# Patient Record
Sex: FEMALE | Race: WHITE | Employment: OTHER | URBAN - METROPOLITAN AREA
[De-identification: names, ages, dates, MRNs, and addresses within clinical notes are randomized per-mention and may not be internally consistent; named-entity substitution may affect disease eponyms.]

---

## 2017-10-04 PROBLEM — E55.9 VITAMIN D DEFICIENCY: Status: ACTIVE | Noted: 2017-10-04

## 2017-10-04 PROBLEM — E53.8 B12 DEFICIENCY: Status: ACTIVE | Noted: 2017-10-04

## 2017-10-04 PROBLEM — M21.612 BUNION, LEFT FOOT: Status: ACTIVE | Noted: 2017-10-04

## 2017-10-04 PROCEDURE — 82607 VITAMIN B-12: CPT | Performed by: INTERNAL MEDICINE

## 2018-05-02 PROCEDURE — 82570 ASSAY OF URINE CREATININE: CPT | Performed by: INTERNAL MEDICINE

## 2018-05-02 PROCEDURE — 82043 UR ALBUMIN QUANTITATIVE: CPT | Performed by: INTERNAL MEDICINE

## 2019-01-01 ENCOUNTER — TELEPHONE (OUTPATIENT)
Dept: HEMATOLOGY/ONCOLOGY | Facility: HOSPITAL | Age: 69
End: 2019-01-01

## 2019-01-01 ENCOUNTER — HOSPITAL ENCOUNTER (OUTPATIENT)
Dept: MAMMOGRAPHY | Facility: HOSPITAL | Age: 69
Discharge: HOME | End: 2019-01-01
Attending: INTERNAL MEDICINE
Payer: MEDICARE

## 2019-01-01 ENCOUNTER — TELEPHONE (OUTPATIENT)
Dept: CT IMAGING | Facility: HOSPITAL | Age: 69
End: 2019-01-01

## 2019-01-01 ENCOUNTER — HOSPITAL ENCOUNTER (OUTPATIENT)
Dept: MAMMOGRAPHY | Facility: HOSPITAL | Age: 69
Discharge: HOME | End: 2019-01-01
Attending: SURGERY
Payer: MEDICARE

## 2019-01-01 ENCOUNTER — HOSPITAL ENCOUNTER (OUTPATIENT)
Dept: MAMMOGRAPHY | Age: 69
Discharge: HOME | End: 2019-01-01
Attending: INTERNAL MEDICINE
Payer: MEDICARE

## 2019-01-01 ENCOUNTER — OFFICE VISIT (OUTPATIENT)
Dept: HEMATOLOGY/ONCOLOGY | Facility: HOSPITAL | Age: 69
End: 2019-01-01
Attending: THORACIC SURGERY (CARDIOTHORACIC VASCULAR SURGERY)
Payer: MEDICARE

## 2019-01-01 ENCOUNTER — HOSPITAL ENCOUNTER (OUTPATIENT)
Age: 69
Discharge: HOME | End: 2019-01-01
Attending: FAMILY MEDICINE
Payer: MEDICARE

## 2019-01-01 ENCOUNTER — HOSPITAL ENCOUNTER (OUTPATIENT)
Age: 69
Discharge: HOME | End: 2019-01-01
Payer: MEDICARE

## 2019-01-01 ENCOUNTER — HOSPITAL ENCOUNTER (OUTPATIENT)
Dept: MRI IMAGING | Facility: HOSPITAL | Age: 69
Discharge: HOME | End: 2019-01-01
Attending: SURGERY
Payer: MEDICARE

## 2019-01-01 ENCOUNTER — APPOINTMENT (OUTPATIENT)
Dept: GENERAL RADIOLOGY | Age: 69
End: 2019-01-01
Attending: FAMILY MEDICINE
Payer: MEDICARE

## 2019-01-01 ENCOUNTER — HOSPITAL ENCOUNTER (INPATIENT)
Facility: HOSPITAL | Age: 69
LOS: 1 days | Discharge: HOME | DRG: 803 | End: 2019-01-01
Attending: THORACIC SURGERY (CARDIOTHORACIC VASCULAR SURGERY) | Admitting: THORACIC SURGERY (CARDIOTHORACIC VASCULAR SURGERY)
Payer: MEDICARE

## 2019-01-01 ENCOUNTER — APPOINTMENT (OUTPATIENT)
Dept: LAB | Age: 69
End: 2019-01-01
Attending: RADIOLOGY
Payer: MEDICARE

## 2019-01-01 ENCOUNTER — TELEPHONE (OUTPATIENT)
Dept: MAMMOGRAPHY | Facility: HOSPITAL | Age: 69
End: 2019-01-01

## 2019-01-01 ENCOUNTER — HOSPITAL ENCOUNTER (OUTPATIENT)
Dept: MRI IMAGING | Facility: HOSPITAL | Age: 69
Discharge: HOME | End: 2019-01-01
Attending: PHYSICIAN ASSISTANT
Payer: MEDICARE

## 2019-01-01 VITALS
DIASTOLIC BLOOD PRESSURE: 63 MMHG | WEIGHT: 132.69 LBS | SYSTOLIC BLOOD PRESSURE: 121 MMHG | HEIGHT: 62 IN | OXYGEN SATURATION: 99 % | HEART RATE: 68 BPM | RESPIRATION RATE: 18 BRPM | BODY MASS INDEX: 24.42 KG/M2 | TEMPERATURE: 98 F

## 2019-01-01 VITALS
OXYGEN SATURATION: 96 % | HEIGHT: 62.01 IN | BODY MASS INDEX: 23.69 KG/M2 | HEART RATE: 81 BPM | WEIGHT: 130.38 LBS | DIASTOLIC BLOOD PRESSURE: 74 MMHG | TEMPERATURE: 97 F | SYSTOLIC BLOOD PRESSURE: 152 MMHG | RESPIRATION RATE: 20 BRPM

## 2019-01-01 VITALS
RESPIRATION RATE: 16 BRPM | TEMPERATURE: 98 F | SYSTOLIC BLOOD PRESSURE: 179 MMHG | HEART RATE: 85 BPM | DIASTOLIC BLOOD PRESSURE: 83 MMHG | OXYGEN SATURATION: 100 %

## 2019-01-01 VITALS
BODY MASS INDEX: 22.71 KG/M2 | DIASTOLIC BLOOD PRESSURE: 71 MMHG | SYSTOLIC BLOOD PRESSURE: 147 MMHG | TEMPERATURE: 98 F | OXYGEN SATURATION: 98 % | HEART RATE: 87 BPM | HEIGHT: 62.01 IN | WEIGHT: 125 LBS | RESPIRATION RATE: 12 BRPM

## 2019-01-01 VITALS
TEMPERATURE: 98 F | WEIGHT: 125 LBS | BODY MASS INDEX: 23 KG/M2 | SYSTOLIC BLOOD PRESSURE: 141 MMHG | HEART RATE: 86 BPM | HEIGHT: 62 IN | OXYGEN SATURATION: 99 % | DIASTOLIC BLOOD PRESSURE: 59 MMHG | RESPIRATION RATE: 16 BRPM

## 2019-01-01 DIAGNOSIS — R92.8 ABNORMAL FINDING ON BREAST IMAGING: ICD-10-CM

## 2019-01-01 DIAGNOSIS — R92.2 INCONCLUSIVE MAMMOGRAM: ICD-10-CM

## 2019-01-01 DIAGNOSIS — R92.8 ABNORMAL MAMMOGRAM: ICD-10-CM

## 2019-01-01 DIAGNOSIS — C50.812 MALIGNANT NEOPLASM OF OVERLAPPING SITES OF LEFT BREAST IN FEMALE, ESTROGEN RECEPTOR NEGATIVE (HCC): ICD-10-CM

## 2019-01-01 DIAGNOSIS — R92.8 ABNORMAL MAMMOGRAM OF BOTH BREASTS: ICD-10-CM

## 2019-01-01 DIAGNOSIS — R59.9 ENLARGED LYMPH NODES: ICD-10-CM
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
## 2019-01-01 DIAGNOSIS — R92.1 CALCIFICATION OF RIGHT BREAST ON MAMMOGRAPHY: ICD-10-CM

## 2019-01-01 DIAGNOSIS — H61.21 IMPACTED CERUMEN OF RIGHT EAR: Primary | ICD-10-CM

## 2019-01-01 DIAGNOSIS — C50.912 MALIGNANT NEOPLASM OF LEFT FEMALE BREAST, UNSPECIFIED ESTROGEN RECEPTOR STATUS, UNSPECIFIED SITE OF BREAST (HCC): ICD-10-CM

## 2019-01-01 DIAGNOSIS — M25.469 SUPRAPATELLAR EFFUSION OF KNEE: Primary | ICD-10-CM

## 2019-01-01 DIAGNOSIS — R92.8 ABNORMAL FINDING ON BREAST IMAGING: Primary | ICD-10-CM

## 2019-01-01 DIAGNOSIS — Z12.39 ENCOUNTER FOR SPECIAL SCREENING EXAMINATION FOR NEOPLASM OF BREAST: ICD-10-CM

## 2019-01-01 DIAGNOSIS — M17.12 PRIMARY OSTEOARTHRITIS OF LEFT KNEE: ICD-10-CM

## 2019-01-01 DIAGNOSIS — Z17.1 MALIGNANT NEOPLASM OF OVERLAPPING SITES OF LEFT BREAST IN FEMALE, ESTROGEN RECEPTOR NEGATIVE (HCC): ICD-10-CM
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
## 2019-01-01 LAB
ANTIBODY SCREEN: NEGATIVE
BLOOD TYPE BARCODE: 9500
DEPRECATED RDW RBC AUTO: 43.8 FL (ref 35.1–46.3)
ERYTHROCYTE [DISTWIDTH] IN BLOOD BY AUTOMATED COUNT: 13.3 % (ref 11–15)
GLUCOSE BLD-MCNC: 103 MG/DL (ref 70–99)
GLUCOSE BLD-MCNC: 105 MG/DL (ref 70–99)
GLUCOSE BLD-MCNC: 108 MG/DL (ref 70–99)
GLUCOSE BLD-MCNC: 149 MG/DL (ref 70–99)
GLUCOSE BLD-MCNC: 269 MG/DL (ref 70–99)
GLUCOSE BLD-MCNC: 72 MG/DL (ref 70–99)
GLUCOSE BLD-MCNC: 74 MG/DL (ref 70–99)
HCT VFR BLD AUTO: 33.9 % (ref 35–48)
HGB BLD-MCNC: 10.6 G/DL (ref 12–16)
MCH RBC QN AUTO: 27.9 PG (ref 26–34)
MCHC RBC AUTO-ENTMCNC: 31.3 G/DL (ref 31–37)
MCV RBC AUTO: 89.2 FL (ref 80–100)
PLATELET # BLD AUTO: 284 10(3)UL (ref 150–450)
RBC # BLD AUTO: 3.8 X10(6)UL (ref 3.8–5.3)
RH BLOOD TYPE: NEGATIVE
WBC # BLD AUTO: 7.4 X10(3) UL (ref 4–11)

## 2019-01-01 PROCEDURE — 86850 RBC ANTIBODY SCREEN: CPT | Performed by: THORACIC SURGERY (CARDIOTHORACIC VASCULAR SURGERY)

## 2019-01-01 PROCEDURE — 86901 BLOOD TYPING SEROLOGIC RH(D): CPT | Performed by: THORACIC SURGERY (CARDIOTHORACIC VASCULAR SURGERY)

## 2019-01-01 PROCEDURE — 77062 BREAST TOMOSYNTHESIS BI: CPT | Performed by: INTERNAL MEDICINE

## 2019-01-01 PROCEDURE — S0077 INJECTION, CLINDAMYCIN PHOSP: HCPCS | Performed by: PHYSICIAN ASSISTANT

## 2019-01-01 PROCEDURE — 99211 OFF/OP EST MAY X REQ PHY/QHP: CPT

## 2019-01-01 PROCEDURE — 76942 ECHO GUIDE FOR BIOPSY: CPT | Performed by: INTERNAL MEDICINE

## 2019-01-01 PROCEDURE — 69210 REMOVE IMPACTED EAR WAX UNI: CPT

## 2019-01-01 PROCEDURE — 99202 OFFICE O/P NEW SF 15 MIN: CPT

## 2019-01-01 PROCEDURE — 0BJ08ZZ INSPECTION OF TRACHEOBRONCHIAL TREE, VIA NATURAL OR ARTIFICIAL OPENING ENDOSCOPIC: ICD-10-PCS | Performed by: THORACIC SURGERY (CARDIOTHORACIC VASCULAR SURGERY)

## 2019-01-01 PROCEDURE — 99213 OFFICE O/P EST LOW 20 MIN: CPT

## 2019-01-01 PROCEDURE — A9575 INJ GADOTERATE MEGLUMI 0.1ML: HCPCS

## 2019-01-01 PROCEDURE — 82962 GLUCOSE BLOOD TEST: CPT

## 2019-01-01 PROCEDURE — 19081 BX BREAST 1ST LESION STRTCTC: CPT | Performed by: INTERNAL MEDICINE

## 2019-01-01 PROCEDURE — 73562 X-RAY EXAM OF KNEE 3: CPT | Performed by: FAMILY MEDICINE

## 2019-01-01 PROCEDURE — 87086 URINE CULTURE/COLONY COUNT: CPT | Performed by: PHYSICIAN ASSISTANT

## 2019-01-01 PROCEDURE — 77065 DX MAMMO INCL CAD UNI: CPT | Performed by: INTERNAL MEDICINE

## 2019-01-01 PROCEDURE — 38505 NEEDLE BIOPSY LYMPH NODES: CPT | Performed by: INTERNAL MEDICINE

## 2019-01-01 PROCEDURE — 3E0T3BZ INTRODUCTION OF ANESTHETIC AGENT INTO PERIPHERAL NERVES AND PLEXI, PERCUTANEOUS APPROACH: ICD-10-PCS | Performed by: THORACIC SURGERY (CARDIOTHORACIC VASCULAR SURGERY)

## 2019-01-01 PROCEDURE — A9575 INJ GADOTERATE MEGLUMI 0.1ML: HCPCS | Performed by: SURGERY

## 2019-01-01 PROCEDURE — 99204 OFFICE O/P NEW MOD 45 MIN: CPT

## 2019-01-01 PROCEDURE — 77067 SCR MAMMO BI INCL CAD: CPT | Performed by: INTERNAL MEDICINE

## 2019-01-01 PROCEDURE — 07B74ZX EXCISION OF THORAX LYMPHATIC, PERCUTANEOUS ENDOSCOPIC APPROACH, DIAGNOSTIC: ICD-10-PCS | Performed by: THORACIC SURGERY (CARDIOTHORACIC VASCULAR SURGERY)

## 2019-01-01 PROCEDURE — 88344 IMHCHEM/IMCYTCHM EA MLT ANTB: CPT | Performed by: INTERNAL MEDICINE

## 2019-01-01 PROCEDURE — 77049 MRI BREAST C-+ W/CAD BI: CPT | Performed by: PHYSICIAN ASSISTANT

## 2019-01-01 PROCEDURE — 19085 BX BREAST 1ST LESION MR IMAG: CPT | Performed by: SURGERY

## 2019-01-01 PROCEDURE — 85027 COMPLETE CBC AUTOMATED: CPT | Performed by: INTERNAL MEDICINE

## 2019-01-01 PROCEDURE — 85049 AUTOMATED PLATELET COUNT: CPT

## 2019-01-01 PROCEDURE — 77066 DX MAMMO INCL CAD BI: CPT | Performed by: INTERNAL MEDICINE

## 2019-01-01 PROCEDURE — 76641 ULTRASOUND BREAST COMPLETE: CPT | Performed by: INTERNAL MEDICINE

## 2019-01-01 PROCEDURE — 19086 BX BREAST ADD LESION MR IMAG: CPT | Performed by: SURGERY

## 2019-01-01 PROCEDURE — 77066 DX MAMMO INCL CAD BI: CPT | Performed by: SURGERY

## 2019-01-01 PROCEDURE — 36415 COLL VENOUS BLD VENIPUNCTURE: CPT

## 2019-01-01 PROCEDURE — 88305 TISSUE EXAM BY PATHOLOGIST: CPT | Performed by: SURGERY

## 2019-01-01 PROCEDURE — 88305 TISSUE EXAM BY PATHOLOGIST: CPT | Performed by: INTERNAL MEDICINE

## 2019-01-01 PROCEDURE — 19083 BX BREAST 1ST LESION US IMAG: CPT | Performed by: INTERNAL MEDICINE

## 2019-01-01 PROCEDURE — 88360 TUMOR IMMUNOHISTOCHEM/MANUAL: CPT | Performed by: INTERNAL MEDICINE

## 2019-01-01 PROCEDURE — 86920 COMPATIBILITY TEST SPIN: CPT

## 2019-01-01 PROCEDURE — 88305 TISSUE EXAM BY PATHOLOGIST: CPT | Performed by: THORACIC SURGERY (CARDIOTHORACIC VASCULAR SURGERY)

## 2019-01-01 PROCEDURE — 77063 BREAST TOMOSYNTHESIS BI: CPT | Performed by: INTERNAL MEDICINE

## 2019-01-01 PROCEDURE — 86900 BLOOD TYPING SEROLOGIC ABO: CPT | Performed by: THORACIC SURGERY (CARDIOTHORACIC VASCULAR SURGERY)

## 2019-01-01 PROCEDURE — 77049 MRI BREAST C-+ W/CAD BI: CPT | Performed by: SURGERY

## 2019-01-01 RX ORDER — DOCUSATE SODIUM 100 MG/1
100 CAPSULE, LIQUID FILLED ORAL 2 TIMES DAILY
Status: DISCONTINUED | OUTPATIENT
Start: 2019-01-01 | End: 2019-01-01

## 2019-01-01 RX ORDER — MORPHINE SULFATE 4 MG/ML
6 INJECTION, SOLUTION INTRAMUSCULAR; INTRAVENOUS EVERY 4 HOURS PRN
Status: DISCONTINUED | OUTPATIENT
Start: 2019-01-01 | End: 2019-01-01

## 2019-01-01 RX ORDER — SODIUM CHLORIDE 0.9 % (FLUSH) 0.9 %
10 SYRINGE (ML) INJECTION AS NEEDED
Status: DISCONTINUED | OUTPATIENT
Start: 2019-01-01 | End: 2019-01-01

## 2019-01-01 RX ORDER — SODIUM CHLORIDE 9 MG/ML
INJECTION, SOLUTION INTRAVENOUS CONTINUOUS
Status: DISCONTINUED | OUTPATIENT
Start: 2019-01-01 | End: 2019-01-01

## 2019-01-01 RX ORDER — ACETAMINOPHEN 10 MG/ML
1000 INJECTION, SOLUTION INTRAVENOUS EVERY 6 HOURS
Status: DISCONTINUED | OUTPATIENT
Start: 2019-01-01 | End: 2019-01-01

## 2019-01-01 RX ORDER — METOCLOPRAMIDE HYDROCHLORIDE 5 MG/ML
10 INJECTION INTRAMUSCULAR; INTRAVENOUS AS NEEDED
Status: DISCONTINUED | OUTPATIENT
Start: 2019-01-01 | End: 2019-01-01 | Stop reason: HOSPADM

## 2019-01-01 RX ORDER — HYDRALAZINE HYDROCHLORIDE 20 MG/ML
10 INJECTION INTRAMUSCULAR; INTRAVENOUS EVERY 4 HOURS PRN
Status: DISCONTINUED | OUTPATIENT
Start: 2019-01-01 | End: 2019-01-01

## 2019-01-01 RX ORDER — NALOXONE HYDROCHLORIDE 0.4 MG/ML
80 INJECTION, SOLUTION INTRAMUSCULAR; INTRAVENOUS; SUBCUTANEOUS AS NEEDED
Status: DISCONTINUED | OUTPATIENT
Start: 2019-01-01 | End: 2019-01-01 | Stop reason: HOSPADM

## 2019-01-01 RX ORDER — HYDROCODONE BITARTRATE AND ACETAMINOPHEN 5; 325 MG/1; MG/1
1 TABLET ORAL EVERY 6 HOURS PRN
Qty: 30 TABLET | Refills: 0 | Status: SHIPPED | OUTPATIENT
Start: 2019-01-01 | End: 2020-01-01 | Stop reason: ALTCHOICE

## 2019-01-01 RX ORDER — MORPHINE SULFATE 4 MG/ML
4 INJECTION, SOLUTION INTRAMUSCULAR; INTRAVENOUS EVERY 4 HOURS PRN
Status: DISCONTINUED | OUTPATIENT
Start: 2019-01-01 | End: 2019-01-01

## 2019-01-01 RX ORDER — CLINDAMYCIN PHOSPHATE 600 MG/50ML
600 INJECTION INTRAVENOUS EVERY 8 HOURS
Status: COMPLETED | OUTPATIENT
Start: 2019-01-01 | End: 2019-01-01

## 2019-01-01 RX ORDER — SODIUM PHOSPHATE, DIBASIC AND SODIUM PHOSPHATE, MONOBASIC 7; 19 G/133ML; G/133ML
1 ENEMA RECTAL ONCE AS NEEDED
Status: DISCONTINUED | OUTPATIENT
Start: 2019-01-01 | End: 2019-01-01

## 2019-01-01 RX ORDER — DEXTROSE MONOHYDRATE 25 G/50ML
50 INJECTION, SOLUTION INTRAVENOUS
Status: DISCONTINUED | OUTPATIENT
Start: 2019-01-01 | End: 2019-01-01 | Stop reason: HOSPADM

## 2019-01-01 RX ORDER — HYDROCODONE BITARTRATE AND ACETAMINOPHEN 5; 325 MG/1; MG/1
1 TABLET ORAL EVERY 6 HOURS PRN
Qty: 30 TABLET | Refills: 0 | Status: SHIPPED | OUTPATIENT
Start: 2019-01-01

## 2019-01-01 RX ORDER — OXYCODONE HYDROCHLORIDE 5 MG/1
5 TABLET ORAL EVERY 4 HOURS PRN
Status: DISCONTINUED | OUTPATIENT
Start: 2019-01-01 | End: 2019-01-01

## 2019-01-01 RX ORDER — ONDANSETRON 2 MG/ML
4 INJECTION INTRAMUSCULAR; INTRAVENOUS AS NEEDED
Status: DISCONTINUED | OUTPATIENT
Start: 2019-01-01 | End: 2019-01-01 | Stop reason: HOSPADM

## 2019-01-01 RX ORDER — HYDROMORPHONE HYDROCHLORIDE 1 MG/ML
0.4 INJECTION, SOLUTION INTRAMUSCULAR; INTRAVENOUS; SUBCUTANEOUS EVERY 5 MIN PRN
Status: DISCONTINUED | OUTPATIENT
Start: 2019-01-01 | End: 2019-01-01 | Stop reason: HOSPADM

## 2019-01-01 RX ORDER — ACETAMINOPHEN 500 MG
1000 TABLET ORAL ONCE AS NEEDED
Status: DISCONTINUED | OUTPATIENT
Start: 2019-01-01 | End: 2019-01-01 | Stop reason: HOSPADM

## 2019-01-01 RX ORDER — POLYETHYLENE GLYCOL 3350 17 G/17G
17 POWDER, FOR SOLUTION ORAL DAILY PRN
Status: DISCONTINUED | OUTPATIENT
Start: 2019-01-01 | End: 2019-01-01

## 2019-01-01 RX ORDER — CLINDAMYCIN PHOSPHATE 900 MG/50ML
INJECTION INTRAVENOUS
Status: COMPLETED | OUTPATIENT
Start: 2019-01-01 | End: 2019-01-01

## 2019-01-01 RX ORDER — SODIUM CHLORIDE, SODIUM LACTATE, POTASSIUM CHLORIDE, CALCIUM CHLORIDE 600; 310; 30; 20 MG/100ML; MG/100ML; MG/100ML; MG/100ML
INJECTION, SOLUTION INTRAVENOUS CONTINUOUS
Status: DISCONTINUED | OUTPATIENT
Start: 2019-01-01 | End: 2019-01-01 | Stop reason: HOSPADM

## 2019-01-01 RX ORDER — OXYCODONE HYDROCHLORIDE 10 MG/1
10 TABLET ORAL EVERY 4 HOURS PRN
Status: DISCONTINUED | OUTPATIENT
Start: 2019-01-01 | End: 2019-01-01

## 2019-01-01 RX ORDER — LABETALOL HYDROCHLORIDE 5 MG/ML
5 INJECTION, SOLUTION INTRAVENOUS EVERY 5 MIN PRN
Status: DISCONTINUED | OUTPATIENT
Start: 2019-01-01 | End: 2019-01-01 | Stop reason: HOSPADM

## 2019-01-01 RX ORDER — CALCIUM CARBONATE 200(500)MG
1000 TABLET,CHEWABLE ORAL 3 TIMES DAILY PRN
Status: DISCONTINUED | OUTPATIENT
Start: 2019-01-01 | End: 2019-01-01

## 2019-01-01 RX ORDER — MORPHINE SULFATE 4 MG/ML
2 INJECTION, SOLUTION INTRAMUSCULAR; INTRAVENOUS EVERY 4 HOURS PRN
Status: DISCONTINUED | OUTPATIENT
Start: 2019-01-01 | End: 2019-01-01

## 2019-01-01 RX ORDER — BISACODYL 10 MG
10 SUPPOSITORY, RECTAL RECTAL
Status: DISCONTINUED | OUTPATIENT
Start: 2019-01-01 | End: 2019-01-01

## 2019-01-01 RX ORDER — LEVOTHYROXINE SODIUM 112 UG/1
112 TABLET ORAL
Status: DISCONTINUED | OUTPATIENT
Start: 2019-01-01 | End: 2019-01-01

## 2019-01-01 RX ORDER — ONDANSETRON 2 MG/ML
4 INJECTION INTRAMUSCULAR; INTRAVENOUS EVERY 6 HOURS PRN
Status: DISCONTINUED | OUTPATIENT
Start: 2019-01-01 | End: 2019-01-01

## 2019-01-01 RX ORDER — BUPIVACAINE HYDROCHLORIDE 2.5 MG/ML
INJECTION, SOLUTION INFILTRATION; PERINEURAL AS NEEDED
Status: DISCONTINUED | OUTPATIENT
Start: 2019-01-01 | End: 2019-01-01

## 2019-01-01 RX ORDER — DULOXETIN HYDROCHLORIDE 30 MG/1
30 CAPSULE, DELAYED RELEASE ORAL 3 TIMES DAILY
Status: DISCONTINUED | OUTPATIENT
Start: 2019-01-01 | End: 2019-01-01

## 2019-01-01 RX ORDER — HEPARIN SODIUM 5000 [USP'U]/ML
5000 INJECTION, SOLUTION INTRAVENOUS; SUBCUTANEOUS EVERY 8 HOURS SCHEDULED
Status: DISCONTINUED | OUTPATIENT
Start: 2019-01-01 | End: 2019-01-01

## 2019-05-21 NOTE — IMAGING NOTE
Spoke w/pt re: left axillary lymphnode U/S guided bx and bilateral stereotactic breast bx at the recommendation of Dr Terry Real. The procedures were explained and written instructions given as well.  Pt denies blood thinners/bleeding issues, PLT count ordere

## 2019-06-10 NOTE — TELEPHONE ENCOUNTER
Spoke with someone from Dr. Sutton Box office. Pt has new diagnosis of Breast Cancer. Pt is scheduled to come in tomorrow and get results at our [facility]. She will speak with our Radiologist and Breast Care Coordinator Nurse.  PCP would like her

## 2019-06-11 NOTE — IMAGING NOTE
Verified pt's name and . Assisted Dr. Tracee Gee with [facility]. Spoke with patient regarding New breast cancer pathology. Multiple family members present for support. Breast Cancer Book reviewed and given to them.  Surgical consult is

## 2019-06-13 PROBLEM — Z17.1 MALIGNANT NEOPLASM OF OVERLAPPING SITES OF RIGHT BREAST IN FEMALE, ESTROGEN RECEPTOR NEGATIVE (HCC): Status: ACTIVE | Noted: 2019-01-01

## 2019-06-13 PROBLEM — C50.811 MALIGNANT NEOPLASM OF OVERLAPPING SITES OF RIGHT BREAST IN FEMALE, ESTROGEN RECEPTOR NEGATIVE (HCC): Status: ACTIVE | Noted: 2019-01-01

## 2019-06-13 PROBLEM — Z17.1 MALIGNANT NEOPLASM OF OVERLAPPING SITES OF LEFT BREAST IN FEMALE, ESTROGEN RECEPTOR NEGATIVE (HCC): Status: ACTIVE | Noted: 2019-01-01

## 2019-06-13 PROBLEM — C50.812 MALIGNANT NEOPLASM OF OVERLAPPING SITES OF LEFT BREAST IN FEMALE, ESTROGEN RECEPTOR NEGATIVE (HCC): Status: ACTIVE | Noted: 2019-01-01

## 2019-06-21 NOTE — TELEPHONE ENCOUNTER
Received a voicemail from patient regarding use of Claritin and thyroid medications. Explained to patient that I do not work with the [address] [place] [address], so I cannot offer her advice at this time re: medications.  Encouraged her to discuss with Dr. Abbie Kehr and

## 2019-06-26 NOTE — TELEPHONE ENCOUNTER
This Breast Care RN phoned pt re MRI biopsy recommendation by Dr. Miguel Greer for R breast.  Procedure reviewed and all questions answered. Emotional support given. Confirmed pt did receive educational handouts.   On the day of the biopsy, pt instructed to take

## 2019-06-26 NOTE — TELEPHONE ENCOUNTER
Pt phoned navigator with questions regarding her MRI guided biopsy ordered by Dr. Chikis Mohan. Pt states that she didn't know she needed an MRI guided biopsy.  I encouraged her to reach out to Dr. Danilo Chaves office to discuss this as I do not have the results of

## 2019-07-03 NOTE — IMAGING NOTE
Pt arrived with friend. Bilateral MRI guided procedure explained throughout and all questions answered. Consent and discharge paperwork signed. PIV placed in Left AC without difficulty. Emotional support provided throughout.   Pt placed prone in a comfort

## 2019-07-11 NOTE — H&P (VIEW-ONLY)
Thoracic Surgery Consult    Reason for Consultation: breast cancer, enlarged mediastinal node    Consulting Physician: Kari Hernandez    Chief Complaint: \"lymphnode\"    History of Present Illness: Patient is a 71year old, female with Hx newly dx breast cancer T3 mg total) by mouth every 8 (eight) hours as needed for Nausea., Disp: 20 tablet, Rfl: 3  •  LORazepam 0.5 MG Oral Tab, Take 1 tab every 6 hrs if needed for anxiety/difficulty sleeping on the days you take dexamethasone., Disp: 10 tablet, Rfl: 0  •  Prochlo over body  Adderall, [Amphetam*    HIVES  Advil [Ibuprofen]           Comment:Bowel obstruction  Alc-Benzyl Alc-Sulf*    RASH  Flexeril [Cyclobenz*        Comment:Bowel obstruction  Guaifenesin             SWELLING  Influenza Vaccines      ANAPHYLAXIS  Lac Smoking status: Never Smoker      Smokeless tobacco: Never Used    Alcohol use: No      Family History:    Family History   Problem Relation Age of Onset   • Cancer Father         renal cell arcinoma   • Cancer Brother         melanoma, esophageal of a hepatic hemangioma. 2.  Small left ovarian cyst. In light of age, continued imaging surveillance with pelvic ultrasound is recommended.     Assessment:    Patient is a 71year old, female Hx newly dx breast cancer T3N1 who presents today for evaluatio

## 2019-07-11 NOTE — CONSULTS
Thoracic Surgery Consult    Reason for Consultation: breast cancer, enlarged mediastinal node    Consulting Physician: Mahnaz Mcclure    Chief Complaint: \"lymphnode\"    History of Present Illness: Patient is a 71year old, female with Hx newly dx breast cancer T3 mg total) by mouth every 8 (eight) hours as needed for Nausea., Disp: 20 tablet, Rfl: 3  •  LORazepam 0.5 MG Oral Tab, Take 1 tab every 6 hrs if needed for anxiety/difficulty sleeping on the days you take dexamethasone., Disp: 10 tablet, Rfl: 0  •  Prochlo over body  Adderall, [Amphetam*    HIVES  Advil [Ibuprofen]           Comment:Bowel obstruction  Alc-Benzyl Alc-Sulf*    RASH  Flexeril [Cyclobenz*        Comment:Bowel obstruction  Guaifenesin             SWELLING  Influenza Vaccines      ANAPHYLAXIS  Lac Smoking status: Never Smoker      Smokeless tobacco: Never Used    Alcohol use: No      Family History:    Family History   Problem Relation Age of Onset   • Cancer Father         renal cell arcinoma   • Cancer Brother         melanoma, esophageal of a hepatic hemangioma. 2.  Small left ovarian cyst. In light of age, continued imaging surveillance with pelvic ultrasound is recommended.     Assessment:    Patient is a 71year old, female Hx newly dx breast cancer T3N1 who presents today for evaluatio

## 2019-07-15 NOTE — OPERATIVE REPORT
[address]    PATIENT'S NAME: Callie MARIE   ATTENDING PHYSICIAN: [address] Michel Bunn MD   OPERATING PHYSICIAN: Ryan Richardson.  Michel Bunn MD   PATIENT ACCOUNT#:   [id number]    LOCATION:  [place] [address] ED  MEDICAL RECORD #:   [id number]       OMER incision in the inframammary crease midclavicular line, entered the chest bluntly.   A camera through this incision revealed we were safely in the chest.  Two additional incisions were made; a 5 mm incision posterior axillary line eighth intercostal space a nodes.    DRAINS:  24-Telugu chest tube x1. IMPLANTS:  None. ESTIMATED BLOOD LOSS:  20 mL. COMPLICATIONS:  None. CONDITION:  Stable, to PACU. Dictated By Rody Enciso MD  d: 07/15/2019 14:21:04  t: 07/15/2019 15:40:08  Job [id number]/

## 2019-07-15 NOTE — INTERVAL H&P NOTE
Pre-op Diagnosis: breast cancer    The above referenced H&P was reviewed by Lenny Shay on 7/15/2019, the patient was examined and no significant changes have occurred in the patient's condition since the H&P was performed.   I discussed with the patient

## 2019-07-15 NOTE — CONSULTS
General Medicine Consult      Reason for consult: post-op medical management    Consulted by:  RAMAN Matias    PCP: Tee Morrison MD    History of Present Illness: Patient is a 71year old female with PMHx sig for recently diagnosed breast cancer, C Stent- Dr Ivet Mendoza   • OTHER SURGICAL HISTORY  8/25/14    Cysto Stent Removal- [address] [place] [address]    Outpatient Medications Marked as Taking for the 7/15/19 encounter [facility] Encounter):  Vitamin B-12 250 MCG Oral Tab Take 250 mcg by mouth aleksandr mouth daily. Disp: 90 tablet Rfl: 3   HYDROcodone-acetaminophen 5-325 MG Oral Tab Take 1 tablet by mouth every 6 (six) hours as needed for Pain.  Disp: 30 tablet Rfl: 0       Scheduled Medication:  • DULoxetine HCl  30 mg Oral TID   • [START ON 7/16/2019] L esophageal       Review of Systems  Comprehensive 10-point ROS reviewed and negative except for what is stated above in HPI. Negative for chest pain, shortness of breath, fever, chills.        OBJECTIVE:  BP (!) 172/80 (BP Location: Right arm)   Pulse 68 pericardial effusion is identified. The aorta,, pulmonary arteries and great vessels are normal in caliber. Calcific aortic atherosclerosis is noted. MEDIASTINUM/DORCAS:  An enlarged left anterior mediastinal lymph node measures 2.4 x 1.1 cm (50/5).  CHEST WA spine fusion is noted. Remote deformity to the left anterior iliac bone is noted. Moderate to advanced multilevel degenerative changes of the thoracolumbar spine are noted. No aggressive osseous lesion is identified. IMPRESSION:  Chest: 1.   Biopsied lef reconstruction of 3D data sets with additional maximum intensity projects, subtraction, graphic, and kinetic analysis were performed from source data.   The patient was pre-medicated with Benadryl and prednisone prior to the examination with no contrast alejandra mammographically, this measurements is felt to be an underestimation secondary to a large adjacent hematoma.   There is also additional stippled enhancement extending anteriorly and posteriorly from the dominant mass concerning for additional neoplastic inv posterior projections. Additional lateral spot images of the head/neck and thorax as well as anterior and posterior views of the pelvis were also obtained. FINDINGS: Mild/moderate motion limited nuclear medicine bone scan.  Physiologic radionuclide activity inconclusive findings on diagnostic imaging of breast  DESCRIPTION:  Witnessed verbal and written informed consent was obtained. Time out was performed by the staff.   Discussed benefits and risks of MR guided core needle biopsy including, but not limited position of the right breast.   Dictated by: Dontae Shay MD on 7/03/2019 at 16:03     Approved by: Dontae Shay MD                 ASSESSMENT / PLAN:  Patient is a 71year old female with PMHx sig for recently diagnosed breast cancer, COPD, DMII

## 2019-07-15 NOTE — BRIEF OP NOTE
Pre-Operative Diagnosis: breast cancer     Post-Operative Diagnosis: breast cancer      Procedure Performed:   Procedure(s):  Flexible bronchoscopy, left video assisted lymph node dissection.     Surgeon(s) and Role:     * Shahid Hendricks MD - Tobin

## 2019-07-15 NOTE — PLAN OF CARE
Pt arrived from PACU. A/O. Slightly drowsy. Sat WNL on RA. Breathing with ease on RA. Denies pain. S/P Left VATS with lymph node dissection. Dsng C/D/I with no crepitus. Chest tube to 20cm suction draining serasang drainage. SCD's on.  0.9 ns at 60 as order

## 2019-07-16 NOTE — PLAN OF CARE
Patient aox3, vss,ra, lungs clear, left sided chest tube covered with gauze and tape. No crepitus or air leak. Minimal drainaing. Minimal pain on left side. POD 1 from a VATS with Dr Jordan Sneed. Meds given, up in a  chair.    Yesika's RN pulled the chest t

## 2019-07-16 NOTE — PLAN OF CARE
Alert. Oriented. S/p lt vats yesterday. Ct to suction. No air leak noted. Voiding. Up w/ minimal assist. Scds in place. Ivf infusing. Tolerated diet well. No n/v. Medicated w/ oxycodone for surgical pain. Made comfortable in bed. Poc discussed with ptSonja Wick

## 2019-07-16 NOTE — PROGRESS NOTES
Thoracic Surgery Progress Note     Darrell Robertson is a 71year old female. MRN [id number]. Admitted 7/15/2019    [facility] EVENTS:     No acute events overnight. Patient has mild soreness, controlled with pain meds. Tolerating general diet.    Has management. I advised patient to take extra strength Tylenol q6 hours, and told NOT to exceed more than 4,000mg in 24 hours. Norco can be used for breakthrough pain. Patient to hold Tylenol if taking Norco.   Fu appt scheduled.             Philip Meza PA-C

## 2019-07-31 NOTE — PROGRESS NOTES
Thoracic Surgery Postoperative Note    CC: initial post op visit    She is a 71year old female who presents today in follow up after a L VATs mediastinal LN dissection performed on 7/15/19. She is doing well. She complains of pain at incision sites.  Manag 5-325 MG Oral Tab, Take 1 tablet by mouth every 6 (six) hours as needed for Pain., Disp: 30 tablet, Rfl: 0  •  Vitamin B-12 250 MCG Oral Tab, Take 250 mcg by mouth daily. , Disp: , Rfl:   •  Loratadine-Pseudoephedrine ER (CLARITIN-D 24 HOUR)  MG Oral supraclavicular lymphadenopathy appreciated  Heart: regular rate and rhythm. No murmurs, rubs or gallops. No lower extremity edema. Lungs: Normal respiratory effort. Clear to ascultation bilaterally. Chest: incisions are clean, dry and intact.  No signs o

## 2019-08-03 NOTE — ED PROVIDER NOTES
Patient Seen in: THE [facility] Immediate Care In [facility]    History   Patient presents with:  FB in Ear (otologic)    Stated Complaint: Lavella [place]    HPI  71year old female presents stating she thinks there is the tip of the q-tip that is cotton in at [facility] history reviewed and is not pertinent to presenting problem.     Social History    Tobacco Use      Smoking status: Never Smoker      Smokeless tobacco: Never Used    Alcohol use: No    Drug use: No      Review of Systems   All other Disposition and Plan     Clinical Impression:  Impacted cerumen of right ear  (primary encounter diagnosis)    Disposition:  Discharge  8/3/2019  2:42 pm    Follow-up:  Radha Elliott MD  [address] [place] [address]  [address] [facility] [phone]

## 2019-10-17 PROBLEM — T45.1X5A CHEMOTHERAPY-INDUCED NEUROPATHY (HCC): Status: ACTIVE | Noted: 2019-01-01

## 2019-10-17 PROBLEM — G62.0 CHEMOTHERAPY-INDUCED NEUROPATHY (HCC): Status: ACTIVE | Noted: 2019-01-01

## 2019-11-15 NOTE — ED PROVIDER NOTES
Patient Seen in: THE [facility] Immediate Care In [facility]      History   Patient presents with:  Knee Pain    Stated Complaint: knee pain x 2 days    HPI    This 22-year-old female with history for diabetes for breast cancer currently on chemotherapy and ves STENT INSERTION Left 8/9/2014    Performed by Felipa Llanes DO at [facility]   • CYSTOSCOPY, STENT EXCHANGE Left 8/21/2014    Performed by Felipa Llanes DO at [facility]   • EXCIS LUMBAR DISK,ONE LEVEL     • HOLMIUM  LITHOTRIPSY L noted.  EXTREMITIES: The left leg is examined. The left knee shows diffuse swelling. She has some tenderness over the patella. She has worsening pain with flexion. There is no ligamentous laxity noted. There is no tenderness over the joint line.   Norm sleeping. I encouraged the patient to use her cane or walker when ambulating to help prevent falls. The patient is asking for refill on her Vicodin. A prescription for Norco 5/325 1 tablet every 6 hours as needed for severe pain is given.   Usage and marysol on the knee may take several weeks to resolve.

## 2019-12-16 NOTE — IMAGING NOTE
12/16 @ 3 Kp Harvey from  [employer] office made aware pt premedicates after an  ( IV) Gadolinium  reaction 2018 with nausea, sweats and shortness of breath. Since then, pt has been premedicating with 13 hr prep with PO  Prednisone and PO  Benadryl.

## 2020-01-01 ENCOUNTER — APPOINTMENT (OUTPATIENT)
Dept: GENERAL RADIOLOGY | Facility: HOSPITAL | Age: 70
DRG: 920 | End: 2020-01-01
Attending: HOSPITALIST
Payer: MEDICARE

## 2020-01-01 ENCOUNTER — APPOINTMENT (OUTPATIENT)
Dept: GENERAL RADIOLOGY | Facility: HOSPITAL | Age: 70
End: 2020-01-01
Attending: INTERNAL MEDICINE
Payer: MEDICARE

## 2020-01-01 ENCOUNTER — HOSPITAL ENCOUNTER (OUTPATIENT)
Age: 70
Discharge: ANOTHER HOSPITAL | DRG: 920 | End: 2020-01-01
Payer: MEDICARE

## 2020-01-01 ENCOUNTER — APPOINTMENT (OUTPATIENT)
Dept: CV DIAGNOSTICS | Facility: HOSPITAL | Age: 70
DRG: 208 | End: 2020-01-01
Attending: INTERNAL MEDICINE
Payer: MEDICARE

## 2020-01-01 ENCOUNTER — APPOINTMENT (OUTPATIENT)
Dept: GENERAL RADIOLOGY | Age: 70
DRG: 208 | End: 2020-01-01
Attending: EMERGENCY MEDICINE
Payer: MEDICARE

## 2020-01-01 ENCOUNTER — APPOINTMENT (OUTPATIENT)
Dept: ULTRASOUND IMAGING | Facility: HOSPITAL | Age: 70
DRG: 208 | End: 2020-01-01
Attending: INTERNAL MEDICINE
Payer: MEDICARE

## 2020-01-01 ENCOUNTER — HOSPITAL ENCOUNTER (OUTPATIENT)
Facility: HOSPITAL | Age: 70
Discharge: HOME | End: 2020-01-01
Attending: SURGERY | Admitting: SURGERY
Payer: MEDICARE

## 2020-01-01 ENCOUNTER — ANESTHESIA EVENT (OUTPATIENT)
Dept: SURGERY | Facility: HOSPITAL | Age: 70
End: 2020-01-01
Payer: MEDICARE

## 2020-01-01 ENCOUNTER — APPOINTMENT (OUTPATIENT)
Dept: CT IMAGING | Facility: HOSPITAL | Age: 70
DRG: 208 | End: 2020-01-01
Attending: NURSE PRACTITIONER
Payer: MEDICARE

## 2020-01-01 ENCOUNTER — APPOINTMENT (OUTPATIENT)
Dept: CT IMAGING | Age: 70
DRG: 208 | End: 2020-01-01
Attending: EMERGENCY MEDICINE
Payer: MEDICARE

## 2020-01-01 ENCOUNTER — HOSPITAL ENCOUNTER (INPATIENT)
Facility: HOSPITAL | Age: 70
LOS: 2 days | DRG: 208 | End: 2020-01-01
Attending: EMERGENCY MEDICINE | Admitting: INTERNAL MEDICINE
Payer: MEDICARE

## 2020-01-01 ENCOUNTER — APPOINTMENT (OUTPATIENT)
Dept: GENERAL RADIOLOGY | Facility: HOSPITAL | Age: 70
DRG: 208 | End: 2020-01-01
Attending: NURSE PRACTITIONER
Payer: MEDICARE

## 2020-01-01 ENCOUNTER — HOSPITAL ENCOUNTER (INPATIENT)
Facility: HOSPITAL | Age: 70
LOS: 3 days | Discharge: HOME | DRG: 920 | End: 2020-01-01
Attending: EMERGENCY MEDICINE | Admitting: HOSPITALIST
Payer: MEDICARE

## 2020-01-01 ENCOUNTER — APPOINTMENT (OUTPATIENT)
Dept: LAB | Age: 70
End: 2020-01-01
Payer: MEDICARE

## 2020-01-01 ENCOUNTER — APPOINTMENT (OUTPATIENT)
Dept: GENERAL RADIOLOGY | Facility: HOSPITAL | Age: 70
DRG: 208 | End: 2020-01-01
Attending: INTERNAL MEDICINE
Payer: MEDICARE

## 2020-01-01 ENCOUNTER — APPOINTMENT (OUTPATIENT)
Dept: ULTRASOUND IMAGING | Facility: HOSPITAL | Age: 70
DRG: 920 | End: 2020-01-01
Attending: PHYSICIAN ASSISTANT
Payer: MEDICARE

## 2020-01-01 ENCOUNTER — APPOINTMENT (OUTPATIENT)
Dept: GENERAL RADIOLOGY | Facility: HOSPITAL | Age: 70
DRG: 208 | End: 2020-01-01
Attending: HOSPITALIST
Payer: MEDICARE

## 2020-01-01 ENCOUNTER — ANESTHESIA (OUTPATIENT)
Dept: SURGERY | Facility: HOSPITAL | Age: 70
End: 2020-01-01
Payer: MEDICARE

## 2020-01-01 VITALS
WEIGHT: 144 LBS | HEIGHT: 61 IN | BODY MASS INDEX: 27.19 KG/M2 | RESPIRATION RATE: 20 BRPM | DIASTOLIC BLOOD PRESSURE: 89 MMHG | TEMPERATURE: 98 F | HEART RATE: 75 BPM | OXYGEN SATURATION: 100 % | SYSTOLIC BLOOD PRESSURE: 112 MMHG

## 2020-01-01 VITALS
RESPIRATION RATE: 18 BRPM | TEMPERATURE: 99 F | SYSTOLIC BLOOD PRESSURE: 132 MMHG | DIASTOLIC BLOOD PRESSURE: 68 MMHG | OXYGEN SATURATION: 98 % | HEART RATE: 96 BPM

## 2020-01-01 VITALS
SYSTOLIC BLOOD PRESSURE: 140 MMHG | RESPIRATION RATE: 18 BRPM | HEART RATE: 87 BPM | TEMPERATURE: 98 F | OXYGEN SATURATION: 98 % | HEIGHT: 61 IN | BODY MASS INDEX: 26.43 KG/M2 | DIASTOLIC BLOOD PRESSURE: 74 MMHG | WEIGHT: 140 LBS

## 2020-01-01 VITALS
TEMPERATURE: 98 F | SYSTOLIC BLOOD PRESSURE: 96 MMHG | DIASTOLIC BLOOD PRESSURE: 67 MMHG | BODY MASS INDEX: 29 KG/M2 | OXYGEN SATURATION: 85 % | WEIGHT: 158.75 LBS

## 2020-01-01 DIAGNOSIS — C50.812 MALIGNANT NEOPLASM OF OVERLAPPING SITES OF LEFT BREAST IN FEMALE, ESTROGEN RECEPTOR NEGATIVE (HCC): Primary | ICD-10-CM

## 2020-01-01 DIAGNOSIS — Z17.1 MALIGNANT NEOPLASM OF OVERLAPPING SITES OF LEFT BREAST IN FEMALE, ESTROGEN RECEPTOR NEGATIVE (HCC): ICD-10-CM

## 2020-01-01 DIAGNOSIS — T81.40XA POSTOPERATIVE INFECTION, UNSPECIFIED TYPE, INITIAL ENCOUNTER: Primary | ICD-10-CM

## 2020-01-01 DIAGNOSIS — J90 PLEURAL EFFUSION: ICD-10-CM

## 2020-01-01 DIAGNOSIS — R06.00 DYSPNEA, UNSPECIFIED TYPE: ICD-10-CM

## 2020-01-01 DIAGNOSIS — C50.912 MALIGNANT NEOPLASM OF LEFT FEMALE BREAST, UNSPECIFIED ESTROGEN RECEPTOR STATUS, UNSPECIFIED SITE OF BREAST (HCC): ICD-10-CM

## 2020-01-01 DIAGNOSIS — Z17.1 MALIGNANT NEOPLASM OF OVERLAPPING SITES OF LEFT BREAST IN FEMALE, ESTROGEN RECEPTOR NEGATIVE (HCC): Primary | ICD-10-CM

## 2020-01-01 DIAGNOSIS — E87.2 METABOLIC ACIDOSIS: ICD-10-CM

## 2020-01-01 DIAGNOSIS — J18.9 PNEUMONIA DUE TO INFECTIOUS ORGANISM, UNSPECIFIED LATERALITY, UNSPECIFIED PART OF LUNG: Primary | ICD-10-CM

## 2020-01-01 DIAGNOSIS — I26.99 OTHER ACUTE PULMONARY EMBOLISM, UNSPECIFIED WHETHER ACUTE COR PULMONALE PRESENT (HCC): ICD-10-CM

## 2020-01-01 DIAGNOSIS — J96.91 RESPIRATORY FAILURE WITH HYPOXIA, UNSPECIFIED CHRONICITY (HCC): ICD-10-CM

## 2020-01-01 DIAGNOSIS — C50.812 MALIGNANT NEOPLASM OF OVERLAPPING SITES OF LEFT BREAST IN FEMALE, ESTROGEN RECEPTOR NEGATIVE (HCC): ICD-10-CM

## 2020-01-01 DIAGNOSIS — C50.919 MALIGNANT NEOPLASM OF FEMALE BREAST, UNSPECIFIED ESTROGEN RECEPTOR STATUS, UNSPECIFIED LATERALITY, UNSPECIFIED SITE OF BREAST (HCC): ICD-10-CM

## 2020-01-01 DIAGNOSIS — R60.0 BILATERAL LOWER EXTREMITY EDEMA: ICD-10-CM

## 2020-01-01 DIAGNOSIS — E87.2 ACIDEMIA: ICD-10-CM

## 2020-01-01 DIAGNOSIS — N61.0 CELLULITIS OF LEFT BREAST: Primary | ICD-10-CM

## 2020-01-01 LAB
ALBUMIN SERPL-MCNC: 2.6 G/DL (ref 3.4–5)
ALBUMIN/GLOB SERPL: 0.7 {RATIO} (ref 1–2)
ALP LIVER SERPL-CCNC: 87 U/L (ref 55–142)
ALT SERPL-CCNC: 26 U/L (ref 13–56)
ANION GAP SERPL CALC-SCNC: 5 MMOL/L (ref 0–18)
ANION GAP SERPL CALC-SCNC: 6 MMOL/L (ref 0–18)
ANION GAP SERPL CALC-SCNC: 6 MMOL/L (ref 0–18)
ATRIAL RATE: 98 BPM
BASOPHILS # BLD AUTO: 0.06 X10(3) UL (ref 0–0.2)
BASOPHILS # BLD AUTO: 0.07 X10(3) UL (ref 0–0.2)
BASOPHILS # BLD AUTO: 0.11 X10(3) UL (ref 0–0.2)
BASOPHILS NFR BLD AUTO: 0.7 %
BASOPHILS NFR BLD AUTO: 0.9 %
BASOPHILS NFR BLD AUTO: 1.9 %
BILIRUB SERPL-MCNC: 0.6 MG/DL (ref 0.1–2)
BUN BLD-MCNC: 13 MG/DL (ref 7–18)
BUN BLD-MCNC: 13 MG/DL (ref 7–18)
BUN BLD-MCNC: 14 MG/DL (ref 7–18)
BUN BLD-MCNC: 15 MG/DL (ref 7–18)
BUN BLD-MCNC: 22 MG/DL (ref 7–18)
BUN/CREAT SERPL: 13.5 (ref 10–20)
BUN/CREAT SERPL: 14.4 (ref 10–20)
BUN/CREAT SERPL: 14.6 (ref 10–20)
BUN/CREAT SERPL: 19 (ref 10–20)
BUN/CREAT SERPL: 22.9 (ref 10–20)
CALCIUM BLD-MCNC: 8.6 MG/DL (ref 8.5–10.1)
CALCIUM BLD-MCNC: 8.8 MG/DL (ref 8.5–10.1)
CALCIUM BLD-MCNC: 8.9 MG/DL (ref 8.5–10.1)
CALCIUM BLD-MCNC: 9 MG/DL (ref 8.5–10.1)
CALCIUM BLD-MCNC: 9 MG/DL (ref 8.5–10.1)
CHLORIDE SERPL-SCNC: 104 MMOL/L (ref 98–112)
CHLORIDE SERPL-SCNC: 106 MMOL/L (ref 98–112)
CHLORIDE SERPL-SCNC: 109 MMOL/L (ref 98–112)
CHLORIDE SERPL-SCNC: 110 MMOL/L (ref 98–112)
CHLORIDE SERPL-SCNC: 110 MMOL/L (ref 98–112)
CO2 SERPL-SCNC: 22 MMOL/L (ref 21–32)
CO2 SERPL-SCNC: 23 MMOL/L (ref 21–32)
CO2 SERPL-SCNC: 25 MMOL/L (ref 21–32)
CO2 SERPL-SCNC: 25 MMOL/L (ref 21–32)
CO2 SERPL-SCNC: 26 MMOL/L (ref 21–32)
CREAT BLD-MCNC: 0.79 MG/DL (ref 0.55–1.02)
CREAT BLD-MCNC: 0.9 MG/DL (ref 0.55–1.02)
CREAT BLD-MCNC: 0.96 MG/DL (ref 0.55–1.02)
DEPRECATED RDW RBC AUTO: 45.2 FL (ref 35.1–46.3)
DEPRECATED RDW RBC AUTO: 45.5 FL (ref 35.1–46.3)
DEPRECATED RDW RBC AUTO: 45.9 FL (ref 35.1–46.3)
DEPRECATED RDW RBC AUTO: 46 FL (ref 35.1–46.3)
DEPRECATED RDW RBC AUTO: 46.1 FL (ref 35.1–46.3)
EOSINOPHIL # BLD AUTO: 0 X10(3) UL (ref 0–0.7)
EOSINOPHIL # BLD AUTO: 0 X10(3) UL (ref 0–0.7)
EOSINOPHIL # BLD AUTO: 0.01 X10(3) UL (ref 0–0.7)
EOSINOPHIL NFR BLD AUTO: 0 %
EOSINOPHIL NFR BLD AUTO: 0 %
EOSINOPHIL NFR BLD AUTO: 0.2 %
ERYTHROCYTE [DISTWIDTH] IN BLOOD BY AUTOMATED COUNT: 13.9 % (ref 11–15)
ERYTHROCYTE [DISTWIDTH] IN BLOOD BY AUTOMATED COUNT: 14.1 % (ref 11–15)
ERYTHROCYTE [DISTWIDTH] IN BLOOD BY AUTOMATED COUNT: 14.2 % (ref 11–15)
ERYTHROCYTE [DISTWIDTH] IN BLOOD BY AUTOMATED COUNT: 14.3 % (ref 11–15)
ERYTHROCYTE [DISTWIDTH] IN BLOOD BY AUTOMATED COUNT: 14.3 % (ref 11–15)
EST. AVERAGE GLUCOSE BLD GHB EST-MCNC: 146 MG/DL (ref 68–126)
GLOBULIN PLAS-MCNC: 3.9 G/DL (ref 2.8–4.4)
GLUCOSE BLD-MCNC: 104 MG/DL (ref 70–99)
GLUCOSE BLD-MCNC: 104 MG/DL (ref 70–99)
GLUCOSE BLD-MCNC: 110 MG/DL (ref 70–99)
GLUCOSE BLD-MCNC: 114 MG/DL (ref 70–99)
GLUCOSE BLD-MCNC: 114 MG/DL (ref 70–99)
GLUCOSE BLD-MCNC: 115 MG/DL (ref 70–99)
GLUCOSE BLD-MCNC: 115 MG/DL (ref 70–99)
GLUCOSE BLD-MCNC: 126 MG/DL (ref 70–99)
GLUCOSE BLD-MCNC: 126 MG/DL (ref 70–99)
GLUCOSE BLD-MCNC: 128 MG/DL (ref 70–99)
GLUCOSE BLD-MCNC: 130 MG/DL (ref 70–99)
GLUCOSE BLD-MCNC: 131 MG/DL (ref 70–99)
GLUCOSE BLD-MCNC: 133 MG/DL (ref 70–99)
GLUCOSE BLD-MCNC: 135 MG/DL (ref 70–99)
GLUCOSE BLD-MCNC: 136 MG/DL (ref 70–99)
GLUCOSE BLD-MCNC: 146 MG/DL (ref 70–99)
GLUCOSE BLD-MCNC: 149 MG/DL (ref 70–99)
GLUCOSE BLD-MCNC: 151 MG/DL (ref 70–99)
GLUCOSE BLD-MCNC: 153 MG/DL (ref 70–99)
GLUCOSE BLD-MCNC: 155 MG/DL (ref 70–99)
GLUCOSE BLD-MCNC: 156 MG/DL (ref 70–99)
GLUCOSE BLD-MCNC: 160 MG/DL (ref 70–99)
GLUCOSE BLD-MCNC: 163 MG/DL (ref 70–99)
GLUCOSE BLD-MCNC: 165 MG/DL (ref 70–99)
GLUCOSE BLD-MCNC: 166 MG/DL (ref 70–99)
GLUCOSE BLD-MCNC: 168 MG/DL (ref 70–99)
GLUCOSE BLD-MCNC: 171 MG/DL (ref 70–99)
GLUCOSE BLD-MCNC: 174 MG/DL (ref 70–99)
GLUCOSE BLD-MCNC: 181 MG/DL (ref 70–99)
GLUCOSE BLD-MCNC: 181 MG/DL (ref 70–99)
GLUCOSE BLD-MCNC: 191 MG/DL (ref 70–99)
GLUCOSE BLD-MCNC: 196 MG/DL (ref 70–99)
GLUCOSE BLD-MCNC: 220 MG/DL (ref 70–99)
GLUCOSE BLD-MCNC: 227 MG/DL (ref 70–99)
GLUCOSE BLD-MCNC: 233 MG/DL (ref 70–99)
GLUCOSE BLD-MCNC: 264 MG/DL (ref 70–99)
GLUCOSE BLD-MCNC: 275 MG/DL (ref 70–99)
GLUCOSE BLD-MCNC: 61 MG/DL (ref 70–99)
GLUCOSE BLD-MCNC: 75 MG/DL (ref 70–99)
GLUCOSE BLD-MCNC: 98 MG/DL (ref 70–99)
HAV IGM SER QL: 1.5 MG/DL (ref 1.6–2.6)
HAV IGM SER QL: 1.6 MG/DL (ref 1.6–2.6)
HAV IGM SER QL: 1.6 MG/DL (ref 1.6–2.6)
HAV IGM SER QL: 1.7 MG/DL (ref 1.6–2.6)
HBA1C MFR BLD HPLC: 6.7 % (ref ?–5.7)
HCT VFR BLD AUTO: 32.8 % (ref 35–48)
HCT VFR BLD AUTO: 33.3 % (ref 35–48)
HCT VFR BLD AUTO: 33.8 % (ref 35–48)
HCT VFR BLD AUTO: 34.7 % (ref 35–48)
HCT VFR BLD AUTO: 36.6 % (ref 35–48)
HGB BLD-MCNC: 10.2 G/DL (ref 12–16)
HGB BLD-MCNC: 10.3 G/DL (ref 12–16)
HGB BLD-MCNC: 10.7 G/DL (ref 12–16)
HGB BLD-MCNC: 10.9 G/DL (ref 12–16)
HGB BLD-MCNC: 11.4 G/DL (ref 12–16)
IMM GRANULOCYTES # BLD AUTO: 0.01 X10(3) UL (ref 0–1)
IMM GRANULOCYTES # BLD AUTO: 0.02 X10(3) UL (ref 0–1)
IMM GRANULOCYTES # BLD AUTO: 0.02 X10(3) UL (ref 0–1)
IMM GRANULOCYTES NFR BLD: 0.1 %
IMM GRANULOCYTES NFR BLD: 0.2 %
IMM GRANULOCYTES NFR BLD: 0.3 %
INR BLD: 1.06 (ref 0.9–1.1)
LYMPHOCYTES # BLD AUTO: 1.45 X10(3) UL (ref 1–4)
LYMPHOCYTES # BLD AUTO: 1.53 X10(3) UL (ref 1–4)
LYMPHOCYTES # BLD AUTO: 1.82 X10(3) UL (ref 1–4)
LYMPHOCYTES NFR BLD AUTO: 18.4 %
LYMPHOCYTES NFR BLD AUTO: 23.8 %
LYMPHOCYTES NFR BLD AUTO: 24.7 %
M PROTEIN MFR SERPL ELPH: 6.5 G/DL (ref 6.4–8.2)
MCH RBC QN AUTO: 27.3 PG (ref 26–34)
MCH RBC QN AUTO: 27.4 PG (ref 26–34)
MCH RBC QN AUTO: 27.5 PG (ref 26–34)
MCH RBC QN AUTO: 27.7 PG (ref 26–34)
MCH RBC QN AUTO: 27.9 PG (ref 26–34)
MCHC RBC AUTO-ENTMCNC: 30.6 G/DL (ref 31–37)
MCHC RBC AUTO-ENTMCNC: 31.1 G/DL (ref 31–37)
MCHC RBC AUTO-ENTMCNC: 31.4 G/DL (ref 31–37)
MCHC RBC AUTO-ENTMCNC: 31.4 G/DL (ref 31–37)
MCHC RBC AUTO-ENTMCNC: 31.7 G/DL (ref 31–37)
MCV RBC AUTO: 86.7 FL (ref 80–100)
MCV RBC AUTO: 88.2 FL (ref 80–100)
MCV RBC AUTO: 88.3 FL (ref 80–100)
MCV RBC AUTO: 88.9 FL (ref 80–100)
MCV RBC AUTO: 89.3 FL (ref 80–100)
MONOCYTES # BLD AUTO: 0.54 X10(3) UL (ref 0.1–1)
MONOCYTES # BLD AUTO: 0.72 X10(3) UL (ref 0.1–1)
MONOCYTES # BLD AUTO: 0.88 X10(3) UL (ref 0.1–1)
MONOCYTES NFR BLD AUTO: 11.5 %
MONOCYTES NFR BLD AUTO: 8.7 %
MONOCYTES NFR BLD AUTO: 9.2 %
NEUTROPHILS # BLD AUTO: 3.75 X10 (3) UL (ref 1.5–7.7)
NEUTROPHILS # BLD AUTO: 3.75 X10(3) UL (ref 1.5–7.7)
NEUTROPHILS # BLD AUTO: 4.88 X10 (3) UL (ref 1.5–7.7)
NEUTROPHILS # BLD AUTO: 4.88 X10(3) UL (ref 1.5–7.7)
NEUTROPHILS # BLD AUTO: 5.99 X10 (3) UL (ref 1.5–7.7)
NEUTROPHILS # BLD AUTO: 5.99 X10(3) UL (ref 1.5–7.7)
NEUTROPHILS NFR BLD AUTO: 63.7 %
NEUTROPHILS NFR BLD AUTO: 63.7 %
NEUTROPHILS NFR BLD AUTO: 72 %
OSMOLALITY SERPL CALC.SUM OF ELEC: 281 MOSM/KG (ref 275–295)
OSMOLALITY SERPL CALC.SUM OF ELEC: 288 MOSM/KG (ref 275–295)
OSMOLALITY SERPL CALC.SUM OF ELEC: 289 MOSM/KG (ref 275–295)
OSMOLALITY SERPL CALC.SUM OF ELEC: 290 MOSM/KG (ref 275–295)
OSMOLALITY SERPL CALC.SUM OF ELEC: 291 MOSM/KG (ref 275–295)
P AXIS: 23 DEGREES
P-R INTERVAL: 152 MS
PLATELET # BLD AUTO: 295 10(3)UL (ref 150–450)
PLATELET # BLD AUTO: 309 10(3)UL (ref 150–450)
PLATELET # BLD AUTO: 313 10(3)UL (ref 150–450)
PLATELET # BLD AUTO: 314 10(3)UL (ref 150–450)
POTASSIUM SERPL-SCNC: 3.6 MMOL/L (ref 3.5–5.1)
POTASSIUM SERPL-SCNC: 3.8 MMOL/L (ref 3.5–5.1)
POTASSIUM SERPL-SCNC: 4.1 MMOL/L (ref 3.5–5.1)
POTASSIUM SERPL-SCNC: 4.2 MMOL/L (ref 3.5–5.1)
POTASSIUM SERPL-SCNC: 5.1 MMOL/L (ref 3.5–5.1)
PSA SERPL DL<=0.01 NG/ML-MCNC: 14.3 SECONDS (ref 12.5–14.7)
Q-T INTERVAL: 330 MS
QRS DURATION: 68 MS
QTC CALCULATION (BEZET): 421 MS
R AXIS: -20 DEGREES
RBC # BLD AUTO: 3.69 X10(6)UL (ref 3.8–5.3)
RBC # BLD AUTO: 3.73 X10(6)UL (ref 3.8–5.3)
RBC # BLD AUTO: 3.9 X10(6)UL (ref 3.8–5.3)
RBC # BLD AUTO: 3.93 X10(6)UL (ref 3.8–5.3)
RBC # BLD AUTO: 4.15 X10(6)UL (ref 3.8–5.3)
SODIUM SERPL-SCNC: 133 MMOL/L (ref 136–145)
SODIUM SERPL-SCNC: 136 MMOL/L (ref 136–145)
SODIUM SERPL-SCNC: 139 MMOL/L (ref 136–145)
SODIUM SERPL-SCNC: 139 MMOL/L (ref 136–145)
SODIUM SERPL-SCNC: 140 MMOL/L (ref 136–145)
T AXIS: 110 DEGREES
VENTRICULAR RATE: 98 BPM
WBC # BLD AUTO: 5.9 X10(3) UL (ref 4–11)
WBC # BLD AUTO: 6.4 X10(3) UL (ref 4–11)
WBC # BLD AUTO: 6.9 X10(3) UL (ref 4–11)
WBC # BLD AUTO: 7.7 X10(3) UL (ref 4–11)
WBC # BLD AUTO: 8.3 X10(3) UL (ref 4–11)

## 2020-01-01 PROCEDURE — 71275 CT ANGIOGRAPHY CHEST: CPT | Performed by: EMERGENCY MEDICINE

## 2020-01-01 PROCEDURE — 80048 BASIC METABOLIC PNL TOTAL CA: CPT | Performed by: INTERNAL MEDICINE

## 2020-01-01 PROCEDURE — 71045 X-RAY EXAM CHEST 1 VIEW: CPT | Performed by: INTERNAL MEDICINE

## 2020-01-01 PROCEDURE — 87075 CULTR BACTERIA EXCEPT BLOOD: CPT | Performed by: HOSPITALIST

## 2020-01-01 PROCEDURE — 99292 CRITICAL CARE ADDL 30 MIN: CPT | Performed by: INTERNAL MEDICINE

## 2020-01-01 PROCEDURE — 82962 GLUCOSE BLOOD TEST: CPT

## 2020-01-01 PROCEDURE — 87205 SMEAR GRAM STAIN: CPT | Performed by: HOSPITALIST

## 2020-01-01 PROCEDURE — 85027 COMPLETE CBC AUTOMATED: CPT | Performed by: INTERNAL MEDICINE

## 2020-01-01 PROCEDURE — 36415 COLL VENOUS BLD VENIPUNCTURE: CPT

## 2020-01-01 PROCEDURE — 85025 COMPLETE CBC W/AUTO DIFF WBC: CPT | Performed by: HOSPITALIST

## 2020-01-01 PROCEDURE — S0077 INJECTION, CLINDAMYCIN PHOSP: HCPCS | Performed by: PHYSICIAN ASSISTANT

## 2020-01-01 PROCEDURE — 0HBU0ZX EXCISION OF LEFT BREAST, OPEN APPROACH, DIAGNOSTIC: ICD-10-PCS | Performed by: SURGERY

## 2020-01-01 PROCEDURE — 93010 ELECTROCARDIOGRAM REPORT: CPT | Performed by: INTERNAL MEDICINE

## 2020-01-01 PROCEDURE — 83036 HEMOGLOBIN GLYCOSYLATED A1C: CPT | Performed by: HOSPITALIST

## 2020-01-01 PROCEDURE — 83735 ASSAY OF MAGNESIUM: CPT | Performed by: INTERNAL MEDICINE

## 2020-01-01 PROCEDURE — 80053 COMPREHEN METABOLIC PANEL: CPT | Performed by: PHYSICIAN ASSISTANT

## 2020-01-01 PROCEDURE — 71045 X-RAY EXAM CHEST 1 VIEW: CPT | Performed by: NURSE PRACTITIONER

## 2020-01-01 PROCEDURE — 99285 EMERGENCY DEPT VISIT HI MDM: CPT

## 2020-01-01 PROCEDURE — 71045 X-RAY EXAM CHEST 1 VIEW: CPT | Performed by: HOSPITALIST

## 2020-01-01 PROCEDURE — 93970 EXTREMITY STUDY: CPT | Performed by: INTERNAL MEDICINE

## 2020-01-01 PROCEDURE — 87070 CULTURE OTHR SPECIMN AEROBIC: CPT | Performed by: HOSPITALIST

## 2020-01-01 PROCEDURE — 5A12012 PERFORMANCE OF CARDIAC OUTPUT, SINGLE, MANUAL: ICD-10-PCS | Performed by: HOSPITALIST

## 2020-01-01 PROCEDURE — 71045 X-RAY EXAM CHEST 1 VIEW: CPT | Performed by: EMERGENCY MEDICINE

## 2020-01-01 PROCEDURE — 80048 BASIC METABOLIC PNL TOTAL CA: CPT | Performed by: HOSPITALIST

## 2020-01-01 PROCEDURE — 85610 PROTHROMBIN TIME: CPT | Performed by: INTERNAL MEDICINE

## 2020-01-01 PROCEDURE — 99291 CRITICAL CARE FIRST HOUR: CPT | Performed by: INTERNAL MEDICINE

## 2020-01-01 PROCEDURE — 85025 COMPLETE CBC W/AUTO DIFF WBC: CPT | Performed by: PHYSICIAN ASSISTANT

## 2020-01-01 PROCEDURE — 85025 COMPLETE CBC W/AUTO DIFF WBC: CPT | Performed by: INTERNAL MEDICINE

## 2020-01-01 PROCEDURE — S0077 INJECTION, CLINDAMYCIN PHOSP: HCPCS | Performed by: INTERNAL MEDICINE

## 2020-01-01 PROCEDURE — 93306 TTE W/DOPPLER COMPLETE: CPT | Performed by: INTERNAL MEDICINE

## 2020-01-01 PROCEDURE — 93005 ELECTROCARDIOGRAM TRACING: CPT

## 2020-01-01 PROCEDURE — 99213 OFFICE O/P EST LOW 20 MIN: CPT

## 2020-01-01 PROCEDURE — 0HTU0ZZ RESECTION OF LEFT BREAST, OPEN APPROACH: ICD-10-PCS | Performed by: SURGERY

## 2020-01-01 PROCEDURE — 10030 IMG GID FLU COLL DRG SFT TIS: CPT | Performed by: PHYSICIAN ASSISTANT

## 2020-01-01 PROCEDURE — 96365 THER/PROPH/DIAG IV INF INIT: CPT

## 2020-01-01 PROCEDURE — 07B60ZX EXCISION OF LEFT AXILLARY LYMPHATIC, OPEN APPROACH, DIAGNOSTIC: ICD-10-PCS | Performed by: SURGERY

## 2020-01-01 PROCEDURE — 84132 ASSAY OF SERUM POTASSIUM: CPT | Performed by: HOSPITALIST

## 2020-01-01 PROCEDURE — 70450 CT HEAD/BRAIN W/O DYE: CPT | Performed by: NURSE PRACTITIONER

## 2020-01-01 PROCEDURE — 88307 TISSUE EXAM BY PATHOLOGIST: CPT | Performed by: SURGERY

## 2020-01-01 PROCEDURE — 0H9U30Z DRAINAGE OF LEFT BREAST WITH DRAINAGE DEVICE, PERCUTANEOUS APPROACH: ICD-10-PCS | Performed by: RADIOLOGY

## 2020-01-01 PROCEDURE — 87040 BLOOD CULTURE FOR BACTERIA: CPT | Performed by: PHYSICIAN ASSISTANT

## 2020-01-01 PROCEDURE — 5A1945Z RESPIRATORY VENTILATION, 24-96 CONSECUTIVE HOURS: ICD-10-PCS | Performed by: INTERNAL MEDICINE

## 2020-01-01 PROCEDURE — 83735 ASSAY OF MAGNESIUM: CPT | Performed by: HOSPITALIST

## 2020-01-01 PROCEDURE — 0BH17EZ INSERTION OF ENDOTRACHEAL AIRWAY INTO TRACHEA, VIA NATURAL OR ARTIFICIAL OPENING: ICD-10-PCS | Performed by: EMERGENCY MEDICINE

## 2020-01-01 PROCEDURE — 96375 TX/PRO/DX INJ NEW DRUG ADDON: CPT

## 2020-01-01 RX ORDER — FUROSEMIDE 10 MG/ML
40 INJECTION INTRAMUSCULAR; INTRAVENOUS ONCE
Status: COMPLETED | OUTPATIENT
Start: 2020-01-01 | End: 2020-01-01

## 2020-01-01 RX ORDER — HEPARIN SODIUM AND DEXTROSE 10000; 5 [USP'U]/100ML; G/100ML
18 INJECTION INTRAVENOUS ONCE
Status: COMPLETED | OUTPATIENT
Start: 2020-01-01 | End: 2020-01-01

## 2020-01-01 RX ORDER — ZOLPIDEM TARTRATE 5 MG/1
5 TABLET ORAL NIGHTLY PRN
Status: DISCONTINUED | OUTPATIENT
Start: 2020-01-01 | End: 2020-01-01

## 2020-01-01 RX ORDER — METHYLPREDNISOLONE SODIUM SUCCINATE 125 MG/2ML
125 INJECTION, POWDER, LYOPHILIZED, FOR SOLUTION INTRAMUSCULAR; INTRAVENOUS ONCE
Status: COMPLETED | OUTPATIENT
Start: 2020-01-01 | End: 2020-01-01

## 2020-01-01 RX ORDER — DEXMEDETOMIDINE HYDROCHLORIDE 4 UG/ML
INJECTION, SOLUTION INTRAVENOUS CONTINUOUS
Status: DISCONTINUED | OUTPATIENT
Start: 2020-01-01 | End: 2020-04-09

## 2020-01-01 RX ORDER — MORPHINE SULFATE 4 MG/ML
2 INJECTION, SOLUTION INTRAMUSCULAR; INTRAVENOUS ONCE
Status: COMPLETED | OUTPATIENT
Start: 2020-01-01 | End: 2020-01-01

## 2020-01-01 RX ORDER — ACETAMINOPHEN 325 MG/1
650 TABLET ORAL EVERY 6 HOURS PRN
Status: DISCONTINUED | OUTPATIENT
Start: 2020-01-01 | End: 2020-04-09

## 2020-01-01 RX ORDER — SODIUM CHLORIDE, SODIUM LACTATE, POTASSIUM CHLORIDE, CALCIUM CHLORIDE 600; 310; 30; 20 MG/100ML; MG/100ML; MG/100ML; MG/100ML
INJECTION, SOLUTION INTRAVENOUS CONTINUOUS
Status: DISCONTINUED | OUTPATIENT
Start: 2020-01-01 | End: 2020-01-01 | Stop reason: HOSPADM

## 2020-01-01 RX ORDER — MAGNESIUM SULFATE HEPTAHYDRATE 40 MG/ML
2 INJECTION, SOLUTION INTRAVENOUS ONCE
Status: COMPLETED | OUTPATIENT
Start: 2020-01-01 | End: 2020-01-01

## 2020-01-01 RX ORDER — TRIAMCINOLONE ACETONIDE 5 MG/G
1 OINTMENT TOPICAL 2 TIMES DAILY
Qty: 1 TUBE | Refills: 0 | Status: SHIPPED | OUTPATIENT
Start: 2020-01-01 | End: 2020-01-01

## 2020-01-01 RX ORDER — ETOMIDATE 2 MG/ML
INJECTION INTRAVENOUS
Status: COMPLETED | OUTPATIENT
Start: 2020-01-01 | End: 2020-01-01

## 2020-01-01 RX ORDER — DEXAMETHASONE SODIUM PHOSPHATE 4 MG/ML
VIAL (ML) INJECTION AS NEEDED
Status: DISCONTINUED | OUTPATIENT
Start: 2020-01-01 | End: 2020-01-01 | Stop reason: SURG

## 2020-01-01 RX ORDER — ONDANSETRON 2 MG/ML
4 INJECTION INTRAMUSCULAR; INTRAVENOUS EVERY 6 HOURS PRN
Status: DISCONTINUED | OUTPATIENT
Start: 2020-01-01 | End: 2020-01-01

## 2020-01-01 RX ORDER — DEXTROSE, SODIUM CHLORIDE, SODIUM LACTATE, POTASSIUM CHLORIDE, AND CALCIUM CHLORIDE 5; .6; .31; .03; .02 G/100ML; G/100ML; G/100ML; G/100ML; G/100ML
INJECTION, SOLUTION INTRAVENOUS CONTINUOUS
Status: DISCONTINUED | OUTPATIENT
Start: 2020-01-01 | End: 2020-01-01

## 2020-01-01 RX ORDER — ACETAMINOPHEN 500 MG
1000 TABLET ORAL ONCE
Status: DISCONTINUED | OUTPATIENT
Start: 2020-01-01 | End: 2020-01-01 | Stop reason: HOSPADM

## 2020-01-01 RX ORDER — SODIUM CHLORIDE 9 MG/ML
INJECTION, SOLUTION INTRAVENOUS CONTINUOUS
Status: DISCONTINUED | OUTPATIENT
Start: 2020-01-01 | End: 2020-01-01

## 2020-01-01 RX ORDER — INSULIN LISPRO 100 [IU]/ML
INJECTION, SOLUTION INTRAVENOUS; SUBCUTANEOUS
COMMUNITY

## 2020-01-01 RX ORDER — LEVOTHYROXINE SODIUM 0.12 MG/1
125 TABLET ORAL
Status: DISCONTINUED | OUTPATIENT
Start: 2020-01-01 | End: 2020-04-09

## 2020-01-01 RX ORDER — DIPHENHYDRAMINE HYDROCHLORIDE 50 MG/ML
25 INJECTION INTRAMUSCULAR; INTRAVENOUS ONCE
Status: COMPLETED | OUTPATIENT
Start: 2020-01-01 | End: 2020-01-01

## 2020-01-01 RX ORDER — HYDROCODONE BITARTRATE AND ACETAMINOPHEN 5; 325 MG/1; MG/1
1 TABLET ORAL EVERY 4 HOURS PRN
Status: DISCONTINUED | OUTPATIENT
Start: 2020-01-01 | End: 2020-01-01

## 2020-01-01 RX ORDER — MIDAZOLAM HYDROCHLORIDE 1 MG/ML
2 INJECTION INTRAMUSCULAR; INTRAVENOUS EVERY 5 MIN PRN
Status: DISCONTINUED | OUTPATIENT
Start: 2020-01-01 | End: 2020-04-09

## 2020-01-01 RX ORDER — DOXYCYCLINE HYCLATE 100 MG/1
100 CAPSULE ORAL EVERY 12 HOURS SCHEDULED
Qty: 14 CAPSULE | Refills: 0 | Status: SHIPPED | OUTPATIENT
Start: 2020-01-01 | End: 2020-01-01

## 2020-01-01 RX ORDER — DEXTROSE MONOHYDRATE 25 G/50ML
50 INJECTION, SOLUTION INTRAVENOUS
Status: DISCONTINUED | OUTPATIENT
Start: 2020-01-01 | End: 2020-01-01 | Stop reason: HOSPADM

## 2020-01-01 RX ORDER — METOCLOPRAMIDE HYDROCHLORIDE 5 MG/ML
10 INJECTION INTRAMUSCULAR; INTRAVENOUS EVERY 6 HOURS PRN
Status: DISCONTINUED | OUTPATIENT
Start: 2020-01-01 | End: 2020-01-01

## 2020-01-01 RX ORDER — DULOXETIN HYDROCHLORIDE 30 MG/1
30 CAPSULE, DELAYED RELEASE ORAL 3 TIMES DAILY
Status: DISCONTINUED | OUTPATIENT
Start: 2020-01-01 | End: 2020-01-01

## 2020-01-01 RX ORDER — DIPHENHYDRAMINE HYDROCHLORIDE 50 MG/ML
12.5 INJECTION INTRAMUSCULAR; INTRAVENOUS AS NEEDED
Status: DISCONTINUED | OUTPATIENT
Start: 2020-01-01 | End: 2020-01-01 | Stop reason: HOSPADM

## 2020-01-01 RX ORDER — FENTANYL 12 UG/H
1 PATCH TRANSDERMAL
Status: DISCONTINUED | OUTPATIENT
Start: 2020-01-01 | End: 2020-04-09

## 2020-01-01 RX ORDER — CLINDAMYCIN PHOSPHATE 600 MG/50ML
600 INJECTION INTRAVENOUS EVERY 8 HOURS
Status: DISCONTINUED | OUTPATIENT
Start: 2020-01-01 | End: 2020-01-01

## 2020-01-01 RX ORDER — ACETAMINOPHEN 325 MG/1
650 TABLET ORAL EVERY 4 HOURS PRN
Status: DISCONTINUED | OUTPATIENT
Start: 2020-01-01 | End: 2020-01-01

## 2020-01-01 RX ORDER — SODIUM CHLORIDE 9 MG/ML
INJECTION, SOLUTION INTRAVENOUS CONTINUOUS
Status: ACTIVE | OUTPATIENT
Start: 2020-01-01 | End: 2020-01-01

## 2020-01-01 RX ORDER — INSULIN ASPART 100 [IU]/ML
INJECTION, SOLUTION INTRAVENOUS; SUBCUTANEOUS ONCE
Status: DISCONTINUED | OUTPATIENT
Start: 2020-01-01 | End: 2020-01-01 | Stop reason: HOSPADM

## 2020-01-01 RX ORDER — MAGNESIUM OXIDE 400 MG (241.3 MG MAGNESIUM) TABLET
400 TABLET ONCE
Status: COMPLETED | OUTPATIENT
Start: 2020-01-01 | End: 2020-01-01

## 2020-01-01 RX ORDER — ONDANSETRON 2 MG/ML
4 INJECTION INTRAMUSCULAR; INTRAVENOUS EVERY 6 HOURS PRN
Status: DISCONTINUED | OUTPATIENT
Start: 2020-01-01 | End: 2020-04-09

## 2020-01-01 RX ORDER — FUROSEMIDE 10 MG/ML
20 INJECTION INTRAMUSCULAR; INTRAVENOUS ONCE
Status: DISCONTINUED | OUTPATIENT
Start: 2020-01-01 | End: 2020-01-01

## 2020-01-01 RX ORDER — FUROSEMIDE 10 MG/ML
20 INJECTION INTRAMUSCULAR; INTRAVENOUS ONCE
Status: COMPLETED | OUTPATIENT
Start: 2020-01-01 | End: 2020-01-01

## 2020-01-01 RX ORDER — LEVOTHYROXINE SODIUM 0.12 MG/1
125 TABLET ORAL
Status: DISCONTINUED | OUTPATIENT
Start: 2020-01-01 | End: 2020-01-01

## 2020-01-01 RX ORDER — DIPHENHYDRAMINE HYDROCHLORIDE 50 MG/ML
INJECTION INTRAMUSCULAR; INTRAVENOUS
Status: COMPLETED
Start: 2020-01-01 | End: 2020-01-01

## 2020-01-01 RX ORDER — MORPHINE SULFATE 4 MG/ML
2 INJECTION, SOLUTION INTRAMUSCULAR; INTRAVENOUS
Status: DISCONTINUED | OUTPATIENT
Start: 2020-01-01 | End: 2020-04-09

## 2020-01-01 RX ORDER — EPHEDRINE SULFATE 50 MG/ML
INJECTION, SOLUTION INTRAVENOUS AS NEEDED
Status: DISCONTINUED | OUTPATIENT
Start: 2020-01-01 | End: 2020-01-01 | Stop reason: SURG

## 2020-01-01 RX ORDER — LIDOCAINE HYDROCHLORIDE AND EPINEPHRINE 10; 10 MG/ML; UG/ML
INJECTION, SOLUTION INFILTRATION; PERINEURAL AS NEEDED
Status: DISCONTINUED | OUTPATIENT
Start: 2020-01-01 | End: 2020-01-01 | Stop reason: HOSPADM

## 2020-01-01 RX ORDER — HYDROCODONE BITARTRATE AND ACETAMINOPHEN 5; 325 MG/1; MG/1
1 TABLET ORAL AS NEEDED
Status: DISCONTINUED | OUTPATIENT
Start: 2020-01-01 | End: 2020-01-01 | Stop reason: HOSPADM

## 2020-01-01 RX ORDER — HEPARIN SODIUM 5000 [USP'U]/ML
80 INJECTION INTRAVENOUS; SUBCUTANEOUS ONCE
Status: COMPLETED | OUTPATIENT
Start: 2020-01-01 | End: 2020-01-01

## 2020-01-01 RX ORDER — CEFAZOLIN SODIUM/WATER 2 G/20 ML
2 SYRINGE (ML) INTRAVENOUS ONCE
Status: CANCELLED | OUTPATIENT
Start: 2020-01-01 | End: 2020-01-01

## 2020-01-01 RX ORDER — DIAPER,BRIEF,INFANT-TODD,DISP
EACH MISCELLANEOUS 2 TIMES DAILY
Status: DISCONTINUED | OUTPATIENT
Start: 2020-01-01 | End: 2020-01-01

## 2020-01-01 RX ORDER — POLYETHYLENE GLYCOL 3350 17 G/17G
17 POWDER, FOR SOLUTION ORAL DAILY PRN
Status: DISCONTINUED | OUTPATIENT
Start: 2020-01-01 | End: 2020-04-09

## 2020-01-01 RX ORDER — POTASSIUM CHLORIDE 20 MEQ/1
40 TABLET, EXTENDED RELEASE ORAL ONCE
Status: COMPLETED | OUTPATIENT
Start: 2020-01-01 | End: 2020-01-01

## 2020-01-01 RX ORDER — DOXYCYCLINE HYCLATE 100 MG/1
100 CAPSULE ORAL EVERY 12 HOURS SCHEDULED
Status: DISCONTINUED | OUTPATIENT
Start: 2020-01-01 | End: 2020-01-01

## 2020-01-01 RX ORDER — FUROSEMIDE 10 MG/ML
20 INJECTION INTRAMUSCULAR; INTRAVENOUS
Status: DISCONTINUED | OUTPATIENT
Start: 2020-01-01 | End: 2020-01-01

## 2020-01-01 RX ORDER — SCOLOPAMINE TRANSDERMAL SYSTEM 1 MG/1
1 PATCH, EXTENDED RELEASE TRANSDERMAL
Status: DISCONTINUED | OUTPATIENT
Start: 2020-01-01 | End: 2020-04-09

## 2020-01-01 RX ORDER — DEXTROSE MONOHYDRATE 25 G/50ML
50 INJECTION, SOLUTION INTRAVENOUS
Status: DISCONTINUED | OUTPATIENT
Start: 2020-01-01 | End: 2020-01-01

## 2020-01-01 RX ORDER — HALOPERIDOL 5 MG/ML
2.5 INJECTION INTRAMUSCULAR
Status: DISCONTINUED | OUTPATIENT
Start: 2020-01-01 | End: 2020-04-09

## 2020-01-01 RX ORDER — ONDANSETRON 2 MG/ML
4 INJECTION INTRAMUSCULAR; INTRAVENOUS AS NEEDED
Status: DISCONTINUED | OUTPATIENT
Start: 2020-01-01 | End: 2020-01-01 | Stop reason: HOSPADM

## 2020-01-01 RX ORDER — HYDROMORPHONE HYDROCHLORIDE 1 MG/ML
0.4 INJECTION, SOLUTION INTRAMUSCULAR; INTRAVENOUS; SUBCUTANEOUS EVERY 2 HOUR PRN
Status: DISCONTINUED | OUTPATIENT
Start: 2020-01-01 | End: 2020-01-01

## 2020-01-01 RX ORDER — SODIUM CHLORIDE 9 MG/ML
INJECTION, SOLUTION INTRAVENOUS
Status: COMPLETED | OUTPATIENT
Start: 2020-01-01 | End: 2020-01-01

## 2020-01-01 RX ORDER — CHOLECALCIFEROL (VITAMIN D3) 125 MCG
500 CAPSULE ORAL DAILY
Status: DISCONTINUED | OUTPATIENT
Start: 2020-01-01 | End: 2020-01-01

## 2020-01-01 RX ORDER — DIPHENHYDRAMINE HCL 25 MG
25 CAPSULE ORAL EVERY 6 HOURS PRN
Status: DISCONTINUED | OUTPATIENT
Start: 2020-01-01 | End: 2020-01-01

## 2020-01-01 RX ORDER — HYDROCODONE BITARTRATE AND ACETAMINOPHEN 5; 325 MG/1; MG/1
1 TABLET ORAL EVERY 6 HOURS PRN
Status: DISCONTINUED | OUTPATIENT
Start: 2020-01-01 | End: 2020-01-01

## 2020-01-01 RX ORDER — ACETAMINOPHEN 160 MG/5ML
650 SOLUTION ORAL EVERY 6 HOURS PRN
Status: DISCONTINUED | OUTPATIENT
Start: 2020-01-01 | End: 2020-04-09

## 2020-01-01 RX ORDER — BUPIVACAINE HYDROCHLORIDE 5 MG/ML
INJECTION, SOLUTION EPIDURAL; INTRACAUDAL AS NEEDED
Status: DISCONTINUED | OUTPATIENT
Start: 2020-01-01 | End: 2020-01-01 | Stop reason: HOSPADM

## 2020-01-01 RX ORDER — METOCLOPRAMIDE HYDROCHLORIDE 5 MG/ML
5 INJECTION INTRAMUSCULAR; INTRAVENOUS EVERY 8 HOURS PRN
Status: DISCONTINUED | OUTPATIENT
Start: 2020-01-01 | End: 2020-04-09

## 2020-01-01 RX ORDER — PANTOPRAZOLE SODIUM 40 MG/1
40 TABLET, DELAYED RELEASE ORAL
Status: DISCONTINUED | OUTPATIENT
Start: 2020-01-01 | End: 2020-01-01

## 2020-01-01 RX ORDER — BACITRACIN 50000 [USP'U]/1
INJECTION, POWDER, LYOPHILIZED, FOR SOLUTION INTRAMUSCULAR AS NEEDED
Status: DISCONTINUED | OUTPATIENT
Start: 2020-01-01 | End: 2020-01-01 | Stop reason: HOSPADM

## 2020-01-01 RX ORDER — HEPARIN SODIUM AND DEXTROSE 10000; 5 [USP'U]/100ML; G/100ML
INJECTION INTRAVENOUS CONTINUOUS
Status: DISCONTINUED | OUTPATIENT
Start: 2020-01-01 | End: 2020-04-09

## 2020-01-01 RX ORDER — HYDROCODONE BITARTRATE AND ACETAMINOPHEN 5; 325 MG/1; MG/1
1 TABLET ORAL ONCE
Status: COMPLETED | OUTPATIENT
Start: 2020-01-01 | End: 2020-01-01

## 2020-01-01 RX ORDER — METHYLPREDNISOLONE SODIUM SUCCINATE 125 MG/2ML
INJECTION, POWDER, LYOPHILIZED, FOR SOLUTION INTRAMUSCULAR; INTRAVENOUS
Status: COMPLETED
Start: 2020-01-01 | End: 2020-01-01

## 2020-01-01 RX ORDER — FUROSEMIDE 10 MG/ML
40 INJECTION INTRAMUSCULAR; INTRAVENOUS ONCE
Status: DISCONTINUED | OUTPATIENT
Start: 2020-01-01 | End: 2020-01-01

## 2020-01-01 RX ORDER — LEVOTHYROXINE SODIUM 0.12 MG/1
125 TABLET ORAL DAILY
Status: DISCONTINUED | OUTPATIENT
Start: 2020-01-01 | End: 2020-01-01

## 2020-01-01 RX ORDER — HYDROCODONE BITARTRATE AND ACETAMINOPHEN 5; 325 MG/1; MG/1
2 TABLET ORAL EVERY 4 HOURS PRN
Status: DISCONTINUED | OUTPATIENT
Start: 2020-01-01 | End: 2020-01-01

## 2020-01-01 RX ORDER — DOCUSATE SODIUM 100 MG/1
100 CAPSULE, LIQUID FILLED ORAL 2 TIMES DAILY
Status: DISCONTINUED | OUTPATIENT
Start: 2020-01-01 | End: 2020-01-01

## 2020-01-01 RX ORDER — BISACODYL 10 MG
10 SUPPOSITORY, RECTAL RECTAL
Status: DISCONTINUED | OUTPATIENT
Start: 2020-01-01 | End: 2020-01-01

## 2020-01-01 RX ORDER — HYDROCODONE BITARTRATE AND ACETAMINOPHEN 5; 325 MG/1; MG/1
1 TABLET ORAL EVERY 6 HOURS PRN
Qty: 10 TABLET | Refills: 0 | Status: ON HOLD | OUTPATIENT
Start: 2020-01-01 | End: 2020-01-01

## 2020-01-01 RX ORDER — FUROSEMIDE 10 MG/ML
80 INJECTION INTRAMUSCULAR; INTRAVENOUS ONCE
Status: COMPLETED | OUTPATIENT
Start: 2020-01-01 | End: 2020-01-01

## 2020-01-01 RX ORDER — DIPHENHYDRAMINE HCL 25 MG
25 TABLET ORAL EVERY 6 HOURS PRN
COMMUNITY

## 2020-01-01 RX ORDER — NALOXONE HYDROCHLORIDE 0.4 MG/ML
80 INJECTION, SOLUTION INTRAMUSCULAR; INTRAVENOUS; SUBCUTANEOUS AS NEEDED
Status: DISCONTINUED | OUTPATIENT
Start: 2020-01-01 | End: 2020-01-01 | Stop reason: HOSPADM

## 2020-01-01 RX ORDER — ONDANSETRON 2 MG/ML
INJECTION INTRAMUSCULAR; INTRAVENOUS AS NEEDED
Status: DISCONTINUED | OUTPATIENT
Start: 2020-01-01 | End: 2020-01-01 | Stop reason: SURG

## 2020-01-01 RX ORDER — PANTOPRAZOLE SODIUM 40 MG/1
40 TABLET, DELAYED RELEASE ORAL
COMMUNITY

## 2020-01-01 RX ORDER — FAMOTIDINE 10 MG/ML
20 INJECTION, SOLUTION INTRAVENOUS DAILY
Status: DISCONTINUED | OUTPATIENT
Start: 2020-01-01 | End: 2020-01-01

## 2020-01-01 RX ORDER — MAGNESIUM OXIDE 400 MG (241.3 MG MAGNESIUM) TABLET
800 TABLET ONCE
Status: COMPLETED | OUTPATIENT
Start: 2020-01-01 | End: 2020-01-01

## 2020-01-01 RX ORDER — SODIUM CHLORIDE, SODIUM LACTATE, POTASSIUM CHLORIDE, CALCIUM CHLORIDE 600; 310; 30; 20 MG/100ML; MG/100ML; MG/100ML; MG/100ML
INJECTION, SOLUTION INTRAVENOUS CONTINUOUS
Status: DISCONTINUED | OUTPATIENT
Start: 2020-01-01 | End: 2020-01-01

## 2020-01-01 RX ORDER — POTASSIUM CHLORIDE 1.5 G/1.77G
40 POWDER, FOR SOLUTION ORAL EVERY 4 HOURS
Status: COMPLETED | OUTPATIENT
Start: 2020-01-01 | End: 2020-01-01

## 2020-01-01 RX ORDER — SODIUM PHOSPHATE, DIBASIC AND SODIUM PHOSPHATE, MONOBASIC 7; 19 G/133ML; G/133ML
1 ENEMA RECTAL ONCE AS NEEDED
Status: DISCONTINUED | OUTPATIENT
Start: 2020-01-01 | End: 2020-04-09

## 2020-01-01 RX ORDER — ACETAMINOPHEN 650 MG/1
650 SUPPOSITORY RECTAL EVERY 6 HOURS PRN
Status: DISCONTINUED | OUTPATIENT
Start: 2020-01-01 | End: 2020-04-09

## 2020-01-01 RX ORDER — CLINDAMYCIN PHOSPHATE 900 MG/50ML
900 INJECTION INTRAVENOUS ONCE
Status: COMPLETED | OUTPATIENT
Start: 2020-01-01 | End: 2020-01-01

## 2020-01-01 RX ORDER — DEXTROSE MONOHYDRATE 25 G/50ML
50 INJECTION, SOLUTION INTRAVENOUS
Status: DISCONTINUED | OUTPATIENT
Start: 2020-01-01 | End: 2020-04-09

## 2020-01-01 RX ORDER — ACETAMINOPHEN 160 MG/5ML
650 SOLUTION ORAL EVERY 6 HOURS PRN
Status: DISCONTINUED | OUTPATIENT
Start: 2020-01-01 | End: 2020-01-01

## 2020-01-01 RX ORDER — POTASSIUM CHLORIDE 20 MEQ/1
40 TABLET, EXTENDED RELEASE ORAL EVERY 4 HOURS
Status: COMPLETED | OUTPATIENT
Start: 2020-01-01 | End: 2020-01-01

## 2020-01-01 RX ORDER — ACETAMINOPHEN 325 MG/1
650 TABLET ORAL EVERY 6 HOURS PRN
Status: DISCONTINUED | OUTPATIENT
Start: 2020-01-01 | End: 2020-01-01

## 2020-01-01 RX ORDER — CHLORHEXIDINE GLUCONATE 0.12 MG/ML
15 RINSE ORAL
Status: DISCONTINUED | OUTPATIENT
Start: 2020-01-01 | End: 2020-04-09

## 2020-01-01 RX ORDER — HEPARIN SODIUM 5000 [USP'U]/ML
5000 INJECTION, SOLUTION INTRAVENOUS; SUBCUTANEOUS EVERY 8 HOURS SCHEDULED
Status: DISCONTINUED | OUTPATIENT
Start: 2020-01-01 | End: 2020-01-01

## 2020-01-01 RX ORDER — LIDOCAINE HYDROCHLORIDE 10 MG/ML
INJECTION, SOLUTION EPIDURAL; INFILTRATION; INTRACAUDAL; PERINEURAL AS NEEDED
Status: DISCONTINUED | OUTPATIENT
Start: 2020-01-01 | End: 2020-01-01 | Stop reason: SURG

## 2020-01-01 RX ORDER — HYDROMORPHONE HYDROCHLORIDE 1 MG/ML
0.2 INJECTION, SOLUTION INTRAMUSCULAR; INTRAVENOUS; SUBCUTANEOUS EVERY 2 HOUR PRN
Status: DISCONTINUED | OUTPATIENT
Start: 2020-01-01 | End: 2020-01-01

## 2020-01-01 RX ORDER — HYDROMORPHONE HYDROCHLORIDE 1 MG/ML
0.4 INJECTION, SOLUTION INTRAMUSCULAR; INTRAVENOUS; SUBCUTANEOUS EVERY 5 MIN PRN
Status: DISCONTINUED | OUTPATIENT
Start: 2020-01-01 | End: 2020-01-01 | Stop reason: HOSPADM

## 2020-01-01 RX ORDER — ACETAMINOPHEN 650 MG/1
650 SUPPOSITORY RECTAL EVERY 6 HOURS PRN
Status: DISCONTINUED | OUTPATIENT
Start: 2020-01-01 | End: 2020-01-01

## 2020-01-01 RX ORDER — PHENYLEPHRINE HCL 10 MG/ML
VIAL (ML) INJECTION AS NEEDED
Status: DISCONTINUED | OUTPATIENT
Start: 2020-01-01 | End: 2020-01-01 | Stop reason: SURG

## 2020-01-01 RX ORDER — MEPERIDINE HYDROCHLORIDE 25 MG/ML
12.5 INJECTION INTRAMUSCULAR; INTRAVENOUS; SUBCUTANEOUS AS NEEDED
Status: DISCONTINUED | OUTPATIENT
Start: 2020-01-01 | End: 2020-01-01 | Stop reason: HOSPADM

## 2020-01-01 RX ORDER — BISACODYL 10 MG
10 SUPPOSITORY, RECTAL RECTAL
Status: DISCONTINUED | OUTPATIENT
Start: 2020-01-01 | End: 2020-04-09

## 2020-01-01 RX ORDER — HALOPERIDOL 5 MG/ML
5 INJECTION INTRAMUSCULAR
Status: DISCONTINUED | OUTPATIENT
Start: 2020-01-01 | End: 2020-04-09

## 2020-01-01 RX ORDER — HEPARIN SODIUM AND DEXTROSE 10000; 5 [USP'U]/100ML; G/100ML
INJECTION INTRAVENOUS
Status: DISPENSED
Start: 2020-01-01 | End: 2020-01-01

## 2020-01-01 RX ORDER — CLINDAMYCIN PHOSPHATE 600 MG/50ML
600 INJECTION INTRAVENOUS ONCE
Status: COMPLETED | OUTPATIENT
Start: 2020-01-01 | End: 2020-01-01

## 2020-01-01 RX ORDER — TRIAMCINOLONE ACETONIDE 5 MG/G
OINTMENT TOPICAL 2 TIMES DAILY
Status: DISCONTINUED | OUTPATIENT
Start: 2020-01-01 | End: 2020-01-01

## 2020-01-01 RX ORDER — HYDROMORPHONE HYDROCHLORIDE 1 MG/ML
0.8 INJECTION, SOLUTION INTRAMUSCULAR; INTRAVENOUS; SUBCUTANEOUS EVERY 2 HOUR PRN
Status: DISCONTINUED | OUTPATIENT
Start: 2020-01-01 | End: 2020-01-01

## 2020-01-01 RX ORDER — CHLORHEXIDINE GLUCONATE 0.12 MG/ML
15 RINSE ORAL
Status: DISCONTINUED | OUTPATIENT
Start: 2020-01-01 | End: 2020-01-01

## 2020-01-01 RX ORDER — NOREPINEPHRINE BITARTRATE 1 MG/ML
INJECTION, SOLUTION INTRAVENOUS
Status: DISPENSED
Start: 2020-01-01 | End: 2020-01-01

## 2020-01-01 RX ORDER — POLYETHYLENE GLYCOL 3350 17 G/17G
17 POWDER, FOR SOLUTION ORAL DAILY PRN
Status: DISCONTINUED | OUTPATIENT
Start: 2020-01-01 | End: 2020-01-01

## 2020-01-01 RX ORDER — HYDROCODONE BITARTRATE AND ACETAMINOPHEN 5; 325 MG/1; MG/1
2 TABLET ORAL AS NEEDED
Status: DISCONTINUED | OUTPATIENT
Start: 2020-01-01 | End: 2020-01-01 | Stop reason: HOSPADM

## 2020-01-01 RX ORDER — HEPARIN SODIUM 5000 [USP'U]/ML
INJECTION, SOLUTION INTRAVENOUS; SUBCUTANEOUS
Status: DISPENSED
Start: 2020-01-01 | End: 2020-01-01

## 2020-01-01 RX ADMIN — LIDOCAINE HYDROCHLORIDE 50 MG: 10 INJECTION, SOLUTION EPIDURAL; INFILTRATION; INTRACAUDAL; PERINEURAL at 15:00:00

## 2020-01-01 RX ADMIN — EPHEDRINE SULFATE 5 MG: 50 INJECTION, SOLUTION INTRAVENOUS at 15:28:00

## 2020-01-01 RX ADMIN — EPHEDRINE SULFATE 5 MG: 50 INJECTION, SOLUTION INTRAVENOUS at 15:30:00

## 2020-01-01 RX ADMIN — PHENYLEPHRINE HCL 50 MCG: 10 MG/ML VIAL (ML) INJECTION at 15:17:00

## 2020-01-01 RX ADMIN — SODIUM CHLORIDE, SODIUM LACTATE, POTASSIUM CHLORIDE, CALCIUM CHLORIDE: 600; 310; 30; 20 INJECTION, SOLUTION INTRAVENOUS at 16:20:00

## 2020-01-01 RX ADMIN — PHENYLEPHRINE HCL 50 MCG: 10 MG/ML VIAL (ML) INJECTION at 15:10:00

## 2020-01-01 RX ADMIN — DEXAMETHASONE SODIUM PHOSPHATE 4 MG: 4 MG/ML VIAL (ML) INJECTION at 15:06:00

## 2020-01-01 RX ADMIN — PHENYLEPHRINE HCL 50 MCG: 10 MG/ML VIAL (ML) INJECTION at 15:22:00

## 2020-01-01 RX ADMIN — CLINDAMYCIN PHOSPHATE 900 MG: 900 INJECTION INTRAVENOUS at 15:03:00

## 2020-01-01 RX ADMIN — EPHEDRINE SULFATE 5 MG: 50 INJECTION, SOLUTION INTRAVENOUS at 15:32:00

## 2020-01-01 RX ADMIN — ONDANSETRON 4 MG: 2 INJECTION INTRAMUSCULAR; INTRAVENOUS at 15:49:00

## 2020-01-12 NOTE — H&P (VIEW-ONLY)
1/12/2020    Patient presents with:  Breast Follow-up: F/u left breast cancer, discuss surgery      HPI:    Bailey Stager is a 71year old female who recently complete chemotherapy for her2 positive disease  She had a positive left breast biopsy and posi Tab Take 1 tablet (125 mcg total) by mouth daily.  90 tablet 3   • PANTOPRAZOLE SODIUM 40 MG Oral Tab EC TAKE 1 TABLET(40 MG) BY MOUTH DAILY 30 tablet 2   • LEVOTHYROXINE SODIUM 112 MCG Oral Tab TAKE 1 TABLET BY MOUTH EVERY DAY 90 tablet 0   • diphenhydrAMI HYDROcodone-acetaminophen 5-325 MG Oral Tab Take 1 tablet by mouth every 6 (six) hours as needed for Pain. 30 tablet 0   • Vitamin B-12 250 MCG Oral Tab Take 250 mcg by mouth daily.      • Loratadine-Pseudoephedrine ER (CLARITIN-D 24 HOUR)  MG Oral Ta (SEE COMMENTS)    Comment:Bowel obstruction  Gadolinium Derivati*    OTHER (SEE COMMENTS)    Comment:Reaction, unknown  Nsaids                  OTHER (SEE COMMENTS)    Comment:Bowel problem  Alc-Benzyl Alc-Sulf*    RASH  Iodine (Topical)        RASH    Fam echogenicity. All visualized venous structures compress with demonstrable augmentation and spontaneous color and Doppler waveforms. There is a Baker's cyst in the left popliteal fossa measuring 2.7 x 1.1 x 3.1 cm.     IMPRESSION:  1. RIGHT LOWER EXTREMITY: suggest malignancy. No enlarged axillary adenopathy. Left breast:  Post biopsy changes at 1200 positions anterior and mid depth left breast.  There is no residual abnormal enhancement surrounding the biopsy clips.   There is no suspicious enhancement in t

## 2020-01-28 NOTE — ANESTHESIA PROCEDURE NOTES
Airway  Date/Time: 1/28/2020 3:01 PM  Urgency: elective    Airway not difficult    General Information and Staff    Patient location during procedure: OR  Anesthesiologist: Praveena Coulter MD  Resident/CRNA: Izabella Hodgson CRNA  Performed: JOEY Glass

## 2020-01-28 NOTE — ANESTHESIA POSTPROCEDURE EVALUATION
[facility] Patient Status:  Outpatient in a Bed   Age/Gender 71year old female MRN [id number]   [facility] SURGERY Attending Yossi Álvarez, [facility] Se Day # 0 PCP Kalina Álvarez MD       Anesthesia Post-op Note

## 2020-01-28 NOTE — OPERATIVE REPORT
[facility]    PATIENT'S NAME: Omer Thorpe   ATTENDING PHYSICIAN: Robin Montes M.D. OPERATING PHYSICIAN: Robin Montes M.D.    PATIENT ACCOUNT#:   [de-identified]    LOCATION:  [facility] OR [facility] 4 EDWP 1000  MEDICAL RECORD #:   [id number] tolerated the procedure well. She was taken to recovery room for observation.     Dictated By Bhargavi Steve M.D.  d: 01/28/2020 15:55:10  t: 01/28/2020 16:16:58  [facility] [id number]  LCM/

## 2020-01-28 NOTE — ANESTHESIA PREPROCEDURE EVALUATION
PRE-OP EVALUATION    Patient Name: Kailey James    Pre-op Diagnosis: Malignant neoplasm of left female breast, unspecified estrogen receptor status, unspecified site of breast ([facility] [C50.912]    Procedure(s):  LEFT BREAST MASTECTOMY AXILLARY DISSECTIO Tablet 24 Hr, Take 1 tablet by mouth daily. , Disp: 30 tablet, Rfl: 0  Specialty Vitamins Products (CENTRUM SPECIALIST ENERGY OR), Take by mouth daily. , Disp: , Rfl:   Apoaequorin (PREVAGEN EXTRA STRENGTH OR), Take 1 tablet by mouth daily.   , Disp: , Rfl: diabetes and poorly controlled, type 2, using insulin  (+) hypothyroidism                       Pulmonary        (+) COPD   COPD not requiring home oxygen.                 Neuro/Psych      (+) depression  (+) anxiety         (+) neuromuscular disease  (+) h bilaterally. Other findings            ASA: 3   Plan: general  NPO status verified and patient meets guidelines. Post-procedure pain management plan discussed with surgeon and patient.       Plan/risks discussed with: patient

## 2020-01-28 NOTE — BRIEF OP NOTE
Pre-Operative Diagnosis: Malignant neoplasm of left female breast, unspecified estrogen receptor status, unspecified site of breast ([facility] [C50.912]     Post-Operative Diagnosis: Malignant neoplasm of left female breast, unspecified estrogen receptor status

## 2020-01-28 NOTE — INTERVAL H&P NOTE
Pre-op Diagnosis: Malignant neoplasm of left female breast, unspecified estrogen receptor status, unspecified site of breast [facility] [C50.912]    The above referenced H&P was reviewed by Maxim Avelar MD on 1/28/2020, the patient was examined and no signifi

## 2020-01-29 NOTE — CONSULTS
[facility] Hospitalist Team  Initial Consult          Assessment/Plan:       Breast cancer S/P Left mastectomy with axillary dissection.   - Plan per surgery. Continue PT/OT. Pain is currently controlled.        Hypoxia with ambulation- check chest xr, encourage HALLUCINATION  Advil [Ibuprofen]       OTHER (SEE COMMENTS)    Comment:Bowel obstruction  Flexeril [Cyclobenz*    OTHER (SEE COMMENTS)    Comment:Bowel obstruction  Gadolinium Derivati*    OTHER (SEE COMMENTS)    Comment:Reaction, unknown  Nsaids Stent- Dr Ronal Adams   • OTHER SURGICAL HISTORY  8/25/14    Cysto Stent Removal-DR Rodriguez   • THORACOSCOPY/VATS Left 7/15/2019    Performed by Gavin Enciso., Ar Santana MD at [address] [place] [address] CVOR      Social History    Tobacco Use      Smoking status: Never Smoker Skin color, texture, turgor normal. No rashes or lesions. Neurologic: Normal strength, no focal deficit appreciated       Data:     Laboratory:  No results for input(s): WBC, HGB, MCV, PLT, BAND, INR in the last 168 hours.     Invalid input(s): LYM#, MON

## 2020-01-29 NOTE — PROGRESS NOTES
[facility]  Progress Note    Anita Ishikawa Patient Status:  Outpatient in a Bed    1950 MRN [id number]   [facility] 3NW-A Attending Roberto Rutledge MD   Hosp Day # 0 PCP Kathie Bedoya MD     Subjective:    Patient report

## 2020-01-29 NOTE — PLAN OF CARE
Pt is A&O x4. VSS and afebrile. 4 L O2 via nasal cannula,  maintained. Lung sounds clear, diminished in bases bilaterally. Denies pain. Transverse incision to left breast with steri-strips, c/d/I. 4x4 gauze and ace bandage in place, c/d/I.  Abdomen is so Implement wound care per orders  - Initiate isolation precautions as appropriate  - Initiate Pressure Ulcer prevention bundle as indicated  Outcome: Progressing     Problem: HEMATOLOGIC - ADULT  Goal: Maintains hematologic stability  Description  INTERVENT

## 2020-01-29 NOTE — PLAN OF CARE
Upon reassessment, VSS, afebrile. Pt reports \"slight\" SOB with exertion. Denies JIMMIE. Resps even and nonlabored. Lung sounds diminished in bases. O2 sats 90-93% on room air while in bed/sitting in chair.  O2 sats down to 88% with ambulation on 2 different

## 2020-01-29 NOTE — PLAN OF CARE
Upon assessment, VSS, afebrile. Pt denies chest pain. Tele monitoring shows NSR, HR 80-90's. Pt denies JIMMIE/SOB. O2 weaned from 4L overnight to room air this am and sats 90-92%. CPOX on. Lung sounds diminished in bases.  Pt denies nausea and tolerating diabe wound care per orders  - Initiate isolation precautions as appropriate  - Initiate Pressure Ulcer prevention bundle as indicated  Outcome: Progressing     Problem: HEMATOLOGIC - ADULT  Goal: Maintains hematologic stability  Description  INTERVENTIONS  - As

## 2020-01-30 NOTE — PROGRESS NOTES
NURSING DISCHARGE NOTE    Discharged Home via Wheelchair. Accompanied by Family member and Support staff  Belongings Taken by patient/familyDISCUSSED D/C INSTRUCTIONS WITH PATIENT AND FAMILY . ANSWERED ALL QUESTIONS . VERBALIZED UNDERSTANDING .  DRAIN

## 2020-01-30 NOTE — PROGRESS NOTES
ON ROOM AIR  , ON REST  O2 SAT 96% , WHILE  WALKING  , ON ROOM AIR , O2 SAT  WENT DOWN  TO 91% . WENT BACK TO ROOM , O2 SAT 93% . DENIES ANY SOB OR CHEST PAIN . UPDATED WITH PATIENT .  WILL CONTINUE TO MONITOR

## 2020-01-30 NOTE — PROGRESS NOTES
ALERT AND ORIENT X 4 , ON ROOM AIR , DENIES ANY SOB OR CHEST PAIN .  O SAT ON ROOM AIR  93% , WHILE AMBULATING O2 SAT  88% , AFTER WALK  O2 SAT  90%

## 2020-01-30 NOTE — PROGRESS NOTES
[facility] Hospitalist Team  Progress Note      Krzysztof Saab  4/30/1950    Assessment/Plan:        Breast cancer S/P Left mastectomy with axillary dissection.   - Plan per surgery. Continue PT/OT.   Pain is currently controlled.        Hypoxia with ambulatio Continuous Infusions:   • lactated ringers Stopped (01/28/20 2000)   • Dextrose in Lactated Ringers Stopped (01/29/20 0555)     PRN: Zolpidem Tartrate, bisacodyl, ondansetron HCl, Metoclopramide HCl, acetaminophen **OR** HYDROcodone-acetaminophen **OR*

## 2020-01-30 NOTE — PLAN OF CARE
Problem: GASTROINTESTINAL - ADULT  Goal: Minimal or absence of nausea and vomiting  Description  INTERVENTIONS:  - Maintain adequate hydration with IV or PO as ordered and tolerated  - Nasogastric tube to low intermittent suction as ordered  - Evaluate e usage: patient with low platelets)  INTERVENTIONS:  - Avoid intramuscular injections, enemas and rectal medication administration  - Ensure safe mobilization of patient  - Hold pressure on venipuncture sites to achieve adequate hemostasis  - Assess for sig

## 2020-01-30 NOTE — PLAN OF CARE
Dr. Michela Sever notified of CXR results. Orders received for Lasix 20mg IV x1. Order executed. Will continue to monitor.

## 2020-01-30 NOTE — PROGRESS NOTES
[facility]  Progress Note    [facility] Patient Status:  Outpatient in a Bed    1950 MRN [id number]   [facility] 3NW-A Attending Radha Reid,  [facility] Se Day # 0 PCP Jeannette Aguilera MD     Subjective:    Patient Mely Ji

## 2020-01-30 NOTE — PLAN OF CARE
Pt is A&O x4. VSS and afebrile. Sinus rhythm on tele. Desats when asleep, placed on 2 L O2 via nasal cannula,  maintained. Room air while awake. Lung sounds clear, diminished in bases bilaterally.  C/o pain in left breast, good relief with Tylenol PRN pe Assess and document dressing/incision, wound bed, drain sites and surrounding tissue  - Implement wound care per orders  - Initiate isolation precautions as appropriate  - Initiate Pressure Ulcer prevention bundle as indicated  Outcome: Progressing     Pro

## 2020-01-31 NOTE — DISCHARGE SUMMARY
[facility]  Discharge Summary    Brandeeabimaelsandrine March [facility] Patient Status:  Outpatient in a Bed    1950 MRN [id number]   [facility] 3NW-A Attending No att. providers found   Hosp Day # 0 PCP Jesse Louis MD     Date of Admission: 1 medications which have NOT CHANGED    Acetaminophen (ACETAMINOPHEN EXTRA STRENGTH) 500 MG Oral Cap  Take 1,000 mg by mouth one time., Historical    Pantoprazole Sodium 40 MG Oral Tab EC  Take 40 mg by mouth every morning before breakfast., Historical    In total) by mouth every 8 (eight) hours as needed for Nausea., Normal, Disp-20 tablet, R-3Every 8 hours prn nausea or as instructed by physician    lidocaine-prilocaine (EMLA) 2.5-2.5 % External Cream  Use a small amount to the port 30 minutes prior to acces

## 2020-02-07 PROBLEM — N61.0 CELLULITIS OF LEFT BREAST: Status: ACTIVE | Noted: 2020-01-01

## 2020-02-07 NOTE — ED INITIAL ASSESSMENT (HPI)
Patient presents with left mastectomy site infection. Patient had mastectomy 01/28/20. Patient states it is red and swollen.

## 2020-02-07 NOTE — ED PROVIDER NOTES
Patient Seen in: [facility]      History   Patient presents with:  Postop/Procedure Problem  Derm Problem    Stated Complaint: WOUND X 4 DAYS    HPI    CHIEF COMPLAINT: Possible postop infection     HISTORY OF PRESENT ILLNESS: P • Anxiety    • Arrhythmia     abn ekg   • Cancer ([place] [address].) 05/2019    left breast   • Cataract    • Chronic rhinitis    • COPD (chronic obstructive pulmonary disease) (HCC)    • Depression    • DIABETES    • Diabetes ([place] [address].)    • Diabetes mellitus ([place] [address].)    • Dis Smoking status: Never Smoker      Smokeless tobacco: Never Used    Alcohol use: No    Drug use: No             Review of Systems    Positive for stated complaint: WOUND X 4 DAYS  Other systems are as noted in HPI.   Constitutional and vital signs review Postoperative infection, unspecified type, initial encounter  (primary encounter diagnosis)    Disposition: Ic to ed  2/7/2020  4:14 pm    Follow-up:  No follow-up provider specified.       Medications Prescribed:  Discharge Medication List as of 2/7/2020

## 2020-02-07 NOTE — ED NOTES
Pt noted scratching  Operated site left breast ,Welts noted around the incision area. Pt c/o  Burning sensation on the skin .

## 2020-02-07 NOTE — ED INITIAL ASSESSMENT (HPI)
Left Breast post  Mastectomy - pt had surgery on January 28 Dr Jigna Wasserman. Pt noted redness on the bearst, then itching ans burning sensation.    He took the drains Tuesday he states it appeared irritated , but was told he cannot removed the glue because too e

## 2020-02-08 NOTE — PROGRESS NOTES
NURSING ADMISSION NOTE      Patient admitted via ED bed. Oriented to room. Safety precautions initiated. Bed in low position. Call light in reach.   Pt arrived in stable condition, vss, c/o pain 8/10 but was given [place] in the ED, admission database

## 2020-02-08 NOTE — CONSULTS
[facility]  Report of Consultation    Becca King Patient Status:  Observation    1950 MRN [id number]   [facility] 3NW-A Attending Jennie Ibanez MD   Hosp Day # 0 PCP Tanesha Powers MD     Reason for Consultation:    Ce CYSTOSCOPY STENT INSERTION Left 8/9/2014    Performed by Yolie Anderson DO at [facility] MAIN OR   • CYSTOSCOPY, STENT EXCHANGE Left 8/21/2014    Performed by Yolie Anderson DO at [address] [place] [address]   • EXCIS LUMBAR DISK,ONE LEVEL     • HOLMIUM  LI obstruction  Flexeril [Cyclobenz*    OTHER (SEE COMMENTS)    Comment:Bowel obstruction  Gadolinium Derivati*    OTHER (SEE COMMENTS)    Comment:Reaction, unknown  Nsaids                  OTHER (SEE COMMENTS)    Comment:Bowel problem  Alc-Benzyl Alc-Sulf* Oral Tab, Take 1 tablet by mouth every 6 (six) hours as needed. , Disp: 10 tablet, Rfl: 0  Acetaminophen (ACETAMINOPHEN EXTRA STRENGTH) 500 MG Oral Cap, Take 1,000 mg by mouth one time. , Disp: , Rfl:   Pantoprazole Sodium 40 MG Oral Tab EC, Take 40 mg by mo Misc, Use to test blood sugar three x daily, Disp: 300 each, Rfl: 3  insulin glargine (LANTUS SOLOSTAR) 100 UNIT/ML Subcutaneous Solution Pen-injector, Inject 20 units subq nightly, Disp: 45 mL, Rfl: 1  Glucose Blood (ACCU-CHEK COMPACT PLUS) In Vitro Strip clubbing or edema    CHEST: Erythema of her left chest mastectomy site. This is progressive from her incision. Unsure as to whether this is cellulitis or a skin related rash.   She does have some areas of patchy erythema on her right chest wall and breast Depression     Hypothyroid     Uncontrolled type 2 diabetes mellitus without complication, without long-term current use of insulin     Nephrolithiasis     Hyperlipidemia associated with type 2 diabetes mellitus ([address]     Hypertension complicating diabetes

## 2020-02-08 NOTE — PLAN OF CARE
Pt is Ax4,  on room air, Tele (NSR), left arm precautions d/t left mastectomy, Accu-check QID, IVF and abt infusing, unaccessed port-a-cath right chest, carb controlled/1800 ADA diet, DTV, bowel sounds are active.  Pt had a left mastectomy w/ Dr. Jyothi Chaves

## 2020-02-08 NOTE — ED PROVIDER NOTES
Patient Seen in: [facility] Emergency Department      History   Patient presents with:  Cellulitis    Stated Complaint: mastectomy site infection    HPI    49-year-old female presents to the ED for evaluation of possible infection to mastectomy site • OTHER SURGICAL HISTORY      spinal fusion   • OTHER SURGICAL HISTORY  8/21/14    Cysto LT Laisha Brito, LT Stent- Dr Katia Boone   • OTHER SURGICAL HISTORY  8/25/14    Cysto Stent Removal-DR Rodriguez   • OTHER SURGICAL HISTORY  01/28/2020    Left mas incision noted. Neurological:      Mental Status: She is alert and oriented to person, place, and time.              ED Course     Labs Reviewed   COMP METABOLIC PANEL (14) - Abnormal; Notable for the following components:       Result Value    Glucose 1 Present on Admission  Date Reviewed: 2/7/2020          ICD-10-CM Noted POA    Cellulitis of left breast N61.0 2/7/2020 Unknown

## 2020-02-08 NOTE — ED NOTES
Patient reports that her chest and right wrist are starting to itch. Patient denies SOB. Small amount of redness noted where patient was scratching. Antibiotic stopped and MD made aware.

## 2020-02-09 NOTE — PROGRESS NOTES
[facility] Hospitalist Progress Note     PCP: Marilyn Paz MD    Chief Complaint: follow-up    Overnight/Interim Events:      SUBJECTIVE:  Sitting in chair. Some swelling increase so wrap placed. Asking to have tele removed.      OBJECTIVE:  Temp:  [97.4 Doxycycline Hyclate  100 mg Oral 2 times per day   • aztreonam  1 g Intravenous Q12H   • Levothyroxine Sodium  125 mcg Oral Before breakfast   • Insulin Aspart Pen  1-5 Units Subcutaneous TID CC and HS   • Heparin Sodium (Porcine)  5,000 Units Subcutaneous

## 2020-02-09 NOTE — PROGRESS NOTES
[facility]  Progress Note    Zaida Webber Patient Status:  Observation    1950 MRN [id number]   [facility] 3NW-A Attending Xena Wolff MD   Hosp Day # 0 PCP nAkita Miner MD     Subjective:    Feel slightly better.  Co

## 2020-02-09 NOTE — PROGRESS NOTES
Pt is AOx4. Redness and pain to left chest/breast and under right breast    Lungs are clear and unlabored on room air. Tele NSR    Abdomen soft;nondistended. Bowel sounds active. Pt complains of any issues with bowel movements.     Intermittent nause

## 2020-02-09 NOTE — PLAN OF CARE
Patient complained of itching and pain, treated with benadryl and norco. Using ice packs and cream to L breast. No nausea. Tolerating diabetic diet. Voiding freely. +bm/+flatus. VSS + afebrile. Safety maintained.

## 2020-02-10 NOTE — PROCEDURES
Lt breast fluid collection. 8F pigtail to suction bulb. Serosanguinous fluid, sample to lab. Comp-none.

## 2020-02-10 NOTE — PLAN OF CARE
Problem: Diabetes/Glucose Control  Goal: Glucose maintained within prescribed range  Description  INTERVENTIONS:  - Monitor Blood Glucose as ordered  - Assess for signs and symptoms of hyperglycemia and hypoglycemia  - Administer ordered medications to m reports new pain  - Anticipate increased pain with activity and pre-medicate as appropriate  Outcome: Progressing     Problem: RISK FOR INFECTION - ADULT  Goal: Absence of fever/infection during anticipated neutropenic period  Description  INTERVENTIONS  - Incision(s), wounds(s) or drain site(s) healing without S/S of infection  Description  INTERVENTIONS:  - Assess and document risk factors for pressure ulcer development  - Assess and document skin integrity  - Assess and document dressing/incision, wound b

## 2020-02-10 NOTE — IMAGING NOTE
Report called to Kathy Hernandez RN VSS on RA. Pt tolerated procedure well. Tegaderm ressing CDI to left medial breast. Presently denies pain. A/O x 4 Pt awaiting transport to Room 310. Pt updated on plan of care.

## 2020-02-10 NOTE — CM/SW NOTE
Per daily rounds, the pt is s/p mastectomy in January and has cellulitis of L breast. Plan is for IR drainage of fluid today scheduled at 1730. / to remain available for support and/or discharge planning.      Stewart Rose RN, CCM

## 2020-02-10 NOTE — PROGRESS NOTES
Pt is AOx4. Glasses on. Has some baseline LE weakness, and uses cane intermittently at home. High fall risk. Up with assist.    Pt lungs are diminished. Encouraged use of IS 10x an hour. Pt practices up to 500. GI WDL. Voiding.  Saline locked and t

## 2020-02-10 NOTE — PROGRESS NOTES
[facility] Hospitalist Progress Note     PCP: Brandi Irizarry MD    Chief Complaint: follow-up    Overnight/Interim Events:      SUBJECTIVE:  Laying in bed, feels cold, no fever. Itchy. Feels redness worse.      OBJECTIVE:  Temp:  [97.3 °F (36.3 °C)-98 °F (3 Topical BID   • Vitamin B-12  500 mcg Oral Daily   • cholecalciferol  2,000 Units Oral Daily   • DULoxetine HCl  30 mg Oral TID   • insulin detemir  20 Units Subcutaneous Daily   • Pantoprazole Sodium  40 mg Oral QAM AC   • Doxycycline Hyclate  100 mg Oral

## 2020-02-10 NOTE — PROGRESS NOTES
[facility]  Progress Note    [facility] Patient Status:  Observation    1950 MRN [id number]   [facility] 3NW-A Attending Gilberto Najjar, MD   Hosp Day # 0 PCP Jeannette Aguilera MD     Subjective:    Patient reports that the Seroma  Afebrile, no leukocytosis    Plan:    Continue ace wrap and IV antibiotics   Discussed with Dr Nadeen Fagan IR drainage with drain placement   NPO    Grant Thomas [facility]  2/10/2020

## 2020-02-11 NOTE — CONSULTS
INFECTIOUS DISEASE CONSULT NOTE    Amy Cross Patient Status:  Inpatient    1950 MRN [id number]   [facility] 3NW-A Attending Clifford Vale MD   Hosp Day # 1 PCP Teresa Monet Laterality Date   • BACK SURGERY     • BREAST MODIFIED RADICAL MASTECTOMY Left 1/28/2020    Performed by Denette Castleman, MD at [address] [facility]   • [place] Left 8/9/2014    Performed by Juan Arias DO at [address] [place] [address], [facility]. body  Topamax Sprinkle        HALLUCINATION  Advil [Ibuprofen]       OTHER (SEE COMMENTS)    Comment:Bowel obstruction  Flexeril [Cyclobenz*    OTHER (SEE COMMENTS)    Comment:Bowel obstruction  Gadolinium Derivati*    OTHER (SEE COMMENTS)    Comment:React ondansetron HCl (ZOFRAN) injection 4 mg, 4 mg, Intravenous, Q6H PRN    Review of Systems:    Completed. See pertinent positives and negatives in the the HPI. Constitutional: as above  Eyes: Negative for eye drainage and redness.   Ears, nose, mouth, thro 27.3 27.9   MCHC 31.1 31.7 30.6* 31.4   RDW 14.3 14.3 14.2 14.1   NEPRELIM 5.99 4.88  --   --    WBC 8.3 7.7 6.9 6.4   PLT  --  314.0 295.0 309.0     Recent Labs   Lab 02/07/20  1821  02/08/20  0542 02/08/20  1628 02/10/20  0516 02/11/20  0528   * on 1/29/2020 at 16:48          Us Drn By Cath Perc Soft Tissue(abscess/cyst/ext)img Incl(cpt=10030)    Result Date: 2/10/2020  PROCEDURE:  US DRN BY CATH PERC SOFT TISSUE(ABSCESS/CYST/EXT)IMG INCL(CPT=10030)  COMPARISON:  None.   INDICATIONS:  Recurrent lef Dictated by: Yasmeen Mcmahan MD on 2/10/2020 at 18:04     Approved by: Yasmeen Mcmahan MD on 2/10/2020 at 18:07           ASSESSMENT:    1. Chest wall cellulitis in pt s/p mastectomy    2.  Chest wall seroma- fluid serous and cx so far neg (but sent after abx)    3

## 2020-02-11 NOTE — PLAN OF CARE
A&O x4. VSS and afebrile. Denies any CP, JIMMIE, or calf pain at present. Lungs clear and diminished bilaterally. L breast incision c/d/I, pink and flaky, hydrocortisone cream applied per MAR. IR drain putting out serosang drainage.  Abdomen soft, nontender, n activity and pre-medicate as appropriate  Outcome: Progressing     Problem: RISK FOR INFECTION - ADULT  Goal: Absence of fever/infection during anticipated neutropenic period  Description  INTERVENTIONS  - Monitor WBC  - Administer growth factors as ordere S/S of infection  Description  INTERVENTIONS:  - Assess and document risk factors for pressure ulcer development  - Assess and document skin integrity  - Assess and document dressing/incision, wound bed, drain sites and surrounding tissue  - Implement woun

## 2020-02-11 NOTE — PROGRESS NOTES
[facility]  Progress Note    Vero Raya Patient Status:  Observation    1950 MRN [id number]   [facility] 3NW-A Attending Julia Andrews MD   Hosp Day # 0 PCP Kalina Álvarez MD     Subjective:  IR drain placed with ss o tolerated  Will go home with YASMIN  F/u in 1 week in office    Grant Crum [id number] Surgery  2/11/2020

## 2020-02-11 NOTE — PLAN OF CARE
Brenden is A/Ox4. RA, productive cough, voids. Zofran given once for nausea. Denies pain, up with a cane. YASMIN drain to bulb suction on left side draining serous. Port a cath not assessed. IV infusing with abx. Incision to left breast, remains CDI.  Clears to relaxation techniques  - Monitor for opioid side effects  - Notify MD/LIP if interventions unsuccessful or patient reports new pain  - Anticipate increased pain with activity and pre-medicate as appropriate  Outcome: Progressing     Problem: RISK FOR INFEC cognitive ability or social support system  Outcome: Progressing     Problem: SKIN/TISSUE INTEGRITY - ADULT  Goal: Incision(s), wounds(s) or drain site(s) healing without S/S of infection  Description  INTERVENTIONS:  - Assess and document risk factors for

## 2020-02-12 NOTE — PROGRESS NOTES
[facility] hospitalist daily note  Patient was seen/examined on 2/11/20    S; per pt multiple Abx allergies  ID consulted    Medications in Epic    Pe vitals in Epic    Gen: awake, alert, no respiratory distress  HEENT; mmm, anicteric  CV: RRR, nl S1/S2  Skin polo

## 2020-02-12 NOTE — PROGRESS NOTES
[facility] hospitalist daily note  Patient was seen/examined on 2/12/20     S; no chest pain, no SOB but requiring O2 NC , when off per RN desats  No nausea/emesis  No abd pain     Medications in Epic     Pe.   02/12/20  1208   BP: 134/77   Pulse: 88   Resp: 18

## 2020-02-12 NOTE — PLAN OF CARE
Problem: Diabetes/Glucose Control  Goal: Glucose maintained within prescribed range  Description  INTERVENTIONS:  - Monitor Blood Glucose as ordered  - Assess for signs and symptoms of hyperglycemia and hypoglycemia  - Administer ordered medications to m devices as appropriate  - Consider OT/PT consult to assist with strengthening/mobility  - Encourage toileting schedule  Outcome: Progressing     Problem: DISCHARGE PLANNING  Goal: Discharge to home or other facility with appropriate resources  Description monitor.

## 2020-02-12 NOTE — PROGRESS NOTES
INFECTIOUS DISEASE PROGRESS NOTE    Tally Reel Patient Status:  Inpatient    1950 MRN [id number]   [facility] 3NW-A Attending Malick Bob MD   Hosp Day # 2 PCP Mary Quintero (ZOFRAN) injection 4 mg, 4 mg, Intravenous, Q6H PRN    Review of Systems:    Completed. See pertinent positives and negatives above    Physical Exam:    General: No acute distress. Alert and oriented x 3.   Vital signs: Blood pressure 134/77, pulse 88, temp in this interval not displayed. Microbiology    Reviewed in EMR,   Hospital Encounter on 02/07/20   1.  ANAEROBIC CULTURE     Status: None (Preliminary result)    Collection Time: 02/10/20  4:02 PM   Result Value Ref Range    Anaerobic Culture Pending PERC SOFT TISSUE(ABSCESS/CYST/EXT)IMG INCL(CPT=10030)  COMPARISON:  None. INDICATIONS:  Recurrent left breast fluid collection status post reconstruction. Referral for ultrasound-guided drainage catheter placement.   DESCRIPTION OF PROCEDURE:  The clinica s/p mastectomy  - seems better. still reports some itching which is unusual for cellulitis---> ? Whether there is a component of contact dermatitis    2. Chest wall seroma- fluid serous and cx so far neg (but sent after abx)    3.  H/o allergies to mulitple

## 2020-02-12 NOTE — PROGRESS NOTES
[facility]  Progress Note    Gonsalo Roseer Patient Status:  Inpatient    1950 MRN [id number]   [facility] 3NW-A Attending Wilfrid Durant MD   Hosp Day # 2 PCP Arianna Davila MD     Subjective:    Patient felt the surgical

## 2020-02-13 NOTE — PROGRESS NOTES
NURSING DISCHARGE NOTE    Discharged Home via Wheelchair. Accompanied by Friend  Belongings Taken by patient/family. IV removed. Ace wrap changed before discharge. Provided discharge instructions and education. Pt verbalized understanding of drain.  Dis

## 2020-02-13 NOTE — PROGRESS NOTES
INFECTIOUS DISEASE PROGRESS NOTE    [address] Patient Status:  Inpatient    1950 MRN [id number]   [facility] 3NW-A Attending Alona Yoder MD   Hosp Day # 3 PCP Clementina Carter mg, 4 mg, Intravenous, Q6H PRN    Review of Systems:    Completed. See pertinent positives and negatives above    Physical Exam:    General: No acute distress. Alert and oriented x 3.   Vital signs: Blood pressure 131/69, pulse 91, temperature 98.2 °F (36.8 --   --    ALT 26  --   --   --   --    BILT 0.6  --   --   --   --    TP 6.5  --   --   --   --     < > = values in this interval not displayed. Microbiology    Reviewed in EMR,   Hospital Encounter on 02/07/20   1.  ANAEROBIC CULTURE     Status: Non

## 2020-02-13 NOTE — PROGRESS NOTES
[facility]  Progress Note    Kenyon Rey Patient Status:  Inpatient    1950 MRN [id number]   [facility] 3NW-A Attending Justin Colón, *   Hosp Day # 3 PCP Valeria James MD     Subjective:    Patient noted itc days    Plan:    Antibiotics per ID  Diet as tolerated  Home with drain  No further surgical management  F/u in 1 week   Dc per primary service      Grant Alvarado [facility] Surgery  2/13/2020

## 2020-02-13 NOTE — PROGRESS NOTES
Pt is AOx4. Anxious about plan of care. Page to Dr. Jessica Rao this am. Pt still complaining of chest tightness/SOB. Call back received. Will see pt today. Pt is on room air at this time. Slight OLIVIER. GI WDL.  WDL. Tolerating diet. Accuchecks QID.

## 2020-02-13 NOTE — PLAN OF CARE
Pt AOx4. Pt no c/o pain. RA/2LNC.  . Left YASMIN with SS drainage. Pt was given lasix IV with good UO. Will continue to monitor. 6853. Pt c/o itching to left upper arm and back with hive to left arm. IV ABX were given at 0130. Dr. Linn rockwell.  B

## 2020-02-13 NOTE — DISCHARGE SUMMARY
General Medicine Discharge Summary     Patient ID:  Steffany Chen  71year old  [id number]  4/30/1950    Admit date: 2/7/2020    Discharge date and time:  2/13/20    Attending Physician: Jose Brewer, *     Primary Care Physician: Amelia Helms 55%  -pt reports breathing at baseline. Instructed pt to f/u with PCP within 1 week- earlier if notices feeling worse. Pt expressed understanding  -pt not tachycardic--> has required up to 2L NC-- wean off. IS     DM2 insulin.  stable     Hypothyroidism le (As transcribed by Technologist)  Patient offered no additional history at this time. FINDINGS:  The patient is rotated which limits evaluation. There has been placement of a right chest port.   The tip overlies the expected region of the superior vena collection, epicenter just underneath the incision line. The fluid is anechoic. There are a few thin membranes. Access site was chosen. The overlying skin was prepped in the usual sterile manner. 1% lidocaine was used locally.   Using realtime [employer] every 6 (six) hours as needed for Pain. Take with food. Drink more water to prevent constipation.     Blood Glucose Monitoring Suppl (ACCU-CHEK AJAY) Does not apply Device  Use to test blood sugar three x daily    Cyanocobalamin (VITAMIN B-12 OR)  Take 1 t Medication Changes:      Activity: as directed  Diet: as directed  Wound Care: as directed  Code Status: Full Code  O2: prn    Follow-up with      Schedule an appointment with Xavier Beauchamp MD as soon as possible for a visit in 1 week(s)  [id number]

## 2020-04-06 PROBLEM — C50.919 MALIGNANT NEOPLASM OF FEMALE BREAST, UNSPECIFIED ESTROGEN RECEPTOR STATUS, UNSPECIFIED LATERALITY, UNSPECIFIED SITE OF BREAST (HCC): Status: ACTIVE | Noted: 2020-01-01

## 2020-04-06 PROBLEM — J96.91 RESPIRATORY FAILURE WITH HYPOXIA, UNSPECIFIED CHRONICITY (HCC): Status: ACTIVE | Noted: 2020-01-01

## 2020-04-06 PROBLEM — R06.00 DYSPNEA, UNSPECIFIED TYPE: Status: ACTIVE | Noted: 2020-01-01

## 2020-04-06 PROBLEM — J90 PLEURAL EFFUSION: Status: ACTIVE | Noted: 2020-01-01

## 2020-04-06 PROBLEM — I26.99 OTHER ACUTE PULMONARY EMBOLISM, UNSPECIFIED WHETHER ACUTE COR PULMONALE PRESENT (HCC): Status: ACTIVE | Noted: 2020-01-01

## 2020-04-06 PROBLEM — E87.2 METABOLIC ACIDOSIS: Status: ACTIVE | Noted: 2020-01-01

## 2020-04-06 PROBLEM — R60.0 BILATERAL LOWER EXTREMITY EDEMA: Status: ACTIVE | Noted: 2020-01-01

## 2020-04-06 PROBLEM — J18.9 PNEUMONIA DUE TO INFECTIOUS ORGANISM, UNSPECIFIED LATERALITY, UNSPECIFIED PART OF LUNG: Status: ACTIVE | Noted: 2020-01-01

## 2020-04-06 PROBLEM — J18.9 PNEUMONIA DUE TO INFECTIOUS ORGANISM: Status: ACTIVE | Noted: 2020-01-01

## 2020-04-06 PROBLEM — E87.2 ACIDEMIA: Status: ACTIVE | Noted: 2020-01-01

## 2020-04-06 NOTE — ED NOTES
Report given to ICU RN, ambulance arrived for patient, patient going to [facility]. Propofol infusing at 2.3ml/hr, heparin infusing at 14ml/hr, 0.9 NaCL at 125ml/hr.

## 2020-04-06 NOTE — ED NOTES
This RN took patient to CT via stretcher, returned within 10 minutes, patient still reporting pain to chest, ERMND aware. Unable to get accurate VS due to patient moving constantly. Second RN in room to help stabilize patient.

## 2020-04-06 NOTE — ED INITIAL ASSESSMENT (HPI)
Retaining fluids, Ca related- breast. Mastectomy in February.  Also reports SOB, dry cough, slight chest pain off/on for 1 week

## 2020-04-06 NOTE — ED NOTES
Patient reporting severe chest pain, ERMD at bedside with this RN. Patient reporting sharp pain to chest. Repeat EKG done, patient to have CTA.

## 2020-04-06 NOTE — ED NOTES
Patient moved to room 02, patient to be intubated, RN Thu, and RN Alex Diez, MD Roddy Dance, and RT at bedside.

## 2020-04-06 NOTE — ED NOTES
Report given to floor RN Cely Boone. Patient updated on bed assignment. Bed assignment not ready. Will arrange transportation once room is ready.

## 2020-04-06 NOTE — ED PROVIDER NOTES
Patient Seen in: [facility] Emergency Department In [place]      History   Patient presents with:  Chest Pain Angina  Cough/URI    Stated Complaint: chest pain/sob    HPI    20-year-old female with history of breast cancer, hypothyroidism, COPD, presents 8/9/2014    Performed by Cinthia Zheng DO at [facility]   • CYSTOSCOPY, STENT EXCHANGE Left 8/21/2014    Performed by Cinthia Zheng DO at [facility]   • EXCIS LUMBAR DISK,ONE LEVEL     • HOLMIUM  LITHOTRIPSY LASER WITH CYSTOSCOPY no calf tenderness, dorsal pedal pulses present and equal bilaterally.       ED Course     Labs Reviewed   COMP METABOLIC PANEL (14) - Abnormal; Notable for the following components:       Result Value    Glucose 190 (*)     Sodium 132 (*)     BUN 25 (*) for these tests on the individual orders.    FERRITIN   PROCALCITONIN   RAINBOW DRAW BLUE   RAINBOW DRAW LAVENDER   RAINBOW DRAW LIGHT GREEN   RAINBOW DRAW GOLD   BLOOD CULTURE   BLOOD CULTURE   SARS-COV-2 BY PCR     EKG    Rate, intervals and axes as noted became increasingly hypoxic and started to become confused, patient required intubation for acute respiratory failure, intubation was performed wearing full and 95 facemask respirator and full PPE.   CTA did reveal acute pulmonary embolus to the left lower

## 2020-04-07 NOTE — PROGRESS NOTES
COVID19 results negative. Ok per Dr. Luz Weiner IV and Dr. Abran Whitley to discontinue airborne/droplet isolation given low suspicion for false negative or COVID19 in this patient. Will transfer to CNICU.     Tyron Oppenheim, ACNP-BC  ICU  Phone  [phone]   Pager [phone]

## 2020-04-07 NOTE — CONSULTS
INFECTIOUS DISEASE [address] Patient Status:  Inpatient    1950 MRN [id number]   [facility] 4SW-A Attending Akin Espinal, [address] [facility] Day # 1 PCP Ronak Mckeon at [address] [facility] MAIN OR   • CYSTOSCOPY, STENT EXCHANGE Left 8/21/2014    Performed by Sidney Arellano DO at [address] [facility]   • EXCIS LUMBAR DISK,ONE LEVEL     • HOLMIUM  LITHOTRIPSY LASER WITH CYSTOSCOPY Left 8/21/2014    Performed by Sidney Arellano OTHER (SEE COMMENTS)    Comment:Reaction, unknown  Nsaids                  OTHER (SEE COMMENTS)    Comment:Bowel problem  Alc-Benzyl Alc-Sulf*    RASH  Clindamycin             ITCHING  Iodine (Topical)        RASH    Medications:    Current Facility-Admin 30 g, Oral, Q15 Min PRN **OR** Glucose-Vitamin C (DEX-4) chewable tab 8 tablet, 8 tablet, Oral, Q15 Min PRN  •  Insulin Aspart Pen (NOVOLOG) 100 UNIT/ML flexpen 1-5 Units, 1-5 Units, Subcutaneous, Q6H  •  fentaNYL citrate (SUBLIMAZE) 0.05 MG/ML injection 2 Disp: 400 strip, Rfl: 3  HYDROcodone-acetaminophen (NORCO) 5-325 MG Oral Tab, Take 1 tablet by mouth every 6 (six) hours as needed for Pain. Take with food. Drink more water to prevent constipation. , Disp: 30 tablet, Rfl: 0  Blood Glucose Monitoring Suppl comprehensive 10 point review of systems was completed. Pertinent positives and negatives noted in the the HPI.       Physical Exam:    Sedated intubated  Vital signs: Temp:  [96.6 °F (35.9 °C)-98.2 °F (36.8 °C)] 96.6 °F (35.9 °C)  Pulse:  [] 75  Res 4.25*  --   --  3.83*       Microbiologic Data:   No results found for this visit on 04/06/20.       Imaging:  CTA reviewed  Established Problem list:  Patient Active Problem List:     Depression     Hypothyroid     Uncontrolled type 2 diabetes mellitus wit

## 2020-04-07 NOTE — CONSULTS
[facility] Pharmacy Note: Route Optimization for Levothyroxine (SYNTHROID)    Patient is currently on Levothyroxine (SYNTHROID) 62.5 mcg IV daily.    The patient meets the criteria to convert to the oral equivalent as established by the IV to Oral conversion shanel

## 2020-04-07 NOTE — PROGRESS NOTES
Critical Care Progress Note        NAME: Gonsalo Bruce - ROOM: [address] [place] [address] - MRN: [id number] - Age: 71year old - : 1950  Date of Admission: 2020  2:11 PM  Admission Diagnosis: Acidemia [I96.5]  Metabolic acidosis [L66.1]  Pleural effusion [J9 Medication:  • Continuous dose Heparin infusion 1,136 Units/hr (04/07/20 0303)   • norepinephrine (LEVOPHED) IVPB 4 mg in 250 ml (compounded)     • propofol 30 mcg/kg/min (04/07/20 3300)   • dexmedetomidine     • fentanyl 25 mcg/hr (04/07/20 0039)     PRN Minutes                 Romeo Marie  [facility] Pulmonary and Critical Care

## 2020-04-07 NOTE — PROGRESS NOTES
[facility] Pharmacy Note:  Renal Dose Adjustment    Madalyn Wadsworth has been prescribed famotidine (PEPCID) 20 mg intravenously every 12 hours. Estimated Creatinine Clearance: 26.9 mL/min (A) (based on SCr of 1.56 mg/dL (H)).     Her calculated creatinine christie

## 2020-04-07 NOTE — PROGRESS NOTES
04/07/20 1301   Clinical Encounter Type   Visited With   (Patient was on ventilator in COVID 19  bed.    stood outside the door of room and offered prayer per patients request.  Will continue and am available at pager [phone].)

## 2020-04-07 NOTE — CONSULTS
Critical Care H&P/Consult     NAME: Oumou Carrasco - ROOM: 382/836-L - MRN: [id number] - Age: 71year old - :  1950    Date of Admission: 2020  2:11 PM  Admission Diagnosis: Acidemia [F74.4]  Metabolic acidosis [W48.4]  Pleural effusion [J90] intubation. No fevers. But CT showed GGO PNA and PE and large effusion. She is being ruled out for COVID. She has breast cancer - IIIA. Full code.   On chemo    Past Medical History:   Diagnosis Date   • ALLERGIC RHINITIS    • Anxiety    • Arrhythmia • THORACOSCOPY/VATS Left 7/15/2019    Performed by Haydee Akers., Rodolfo Christianson MD at [address] [facility] History    Tobacco Use      Smoking status: Never Smoker      Smokeless tobacco: Never Used    Alcohol use: No    Family History:  She indic Intravenous, ONCE PRN  [COMPLETED] etomidate (AMIDATE) solution, , Intravenous, Code/Trauma Med  [COMPLETED] succinylcholine chloride (ANECTINE) injection, , Intravenous, Code/Trauma Med  propofol (DIPRIVAN) 10 MG/ML injection, , ,   propofol (DIPRIVAN) 10 BID  PEG 3350 (MIRALAX) powder packet 17 g, 17 g, Oral, Daily PRN  magnesium hydroxide (MILK OF MAGNESIA) 400 MG/5ML suspension 30 mL, 30 mL, Oral, Daily PRN  bisacodyl (DULCOLAX) rectal suppository 10 mg, 10 mg, Rectal, Daily PRN  Fleet Enema (FLEET) 7-19 VT:  [400 mL] 400 mL  PEEP/CPAP (cm H2O):  [5 cm H20] 5 cm H20  MAP (cm H2O):  [8] 8  /82   Pulse 103   Temp 98.2 °F (36.8 °C) (Temporal)   Resp 12   Wt 170 lb (77.1 kg)   SpO2 100%   BMI 31.09 kg/m²   Physical Exam:   General: critical   HEENT: Norm

## 2020-04-07 NOTE — PLAN OF CARE
Pt admitted to unit at [address] [place] [address]. Intubated, OGT in place. Heparin gtt and propofol gtt infusing. Attached to monitors. Updated sister about POC. Alan inserted, diuresing. Fentanyl gtt started. Weaning FiO2 as tolerated. Id notified of new consult, see orders.

## 2020-04-07 NOTE — DIETARY NOTE
[facility]    NUTRITION INITIAL ASSESSMENT    Pt does not meet malnutrition criteria. NUTRITION DIAGNOSIS/PROBLEM:    Inadequate oral intake related to physiological causes as evidenced by vent status, intubated, sedated.     NUTRITION INTERVENTION: signs/symptoms of intolerance (abdominal discomfort/distention)  6.  Alternative means of nutrition at goal to meet 100% patient nutrition prescription    MEDICATIONS:  KCl powder 40 meq, azithromycin, lasix, hydroxychloroquine, insulin, precedex, propofol

## 2020-04-07 NOTE — PROGRESS NOTES
[facility] Pharmacy Note:  Renal Dose Adjustment for Metoclopramide (REGLAN)    Krzysztof Saab has been prescribed Metoclopramide (REGLAN) 10 mg every 8 hours as needed for nausea, vomiting.     Estimated Creatinine Clearance: 34.4 mL/min (A) (based on SCr of 1

## 2020-04-07 NOTE — CONSULTS
[facility] Cardiology Consultation    [facility] Patient Status:  Inpatient    1950 MRN [id number]   [facility] 4SW-A Attending Mino Robbins, [address] [facility] Day # 1 PCP Jeannette Aguilera MD     Reason for Consultation:  cardiomyopathy Christopher Blackmon DO at [facility] MAIN OR   • CYSTOSCOPY, STENT EXCHANGE Left 8/21/2014    Performed by Christopher Blackmon DO at [facility]   • EXCIS LUMBAR DISK,ONE LEVEL     • HOLMIUM  LITHOTRIPSY LASER WITH CYSTOSCOPY Left 8/21/2014    Perform problem  Alc-Benzyl Alc-Sulf*    RASH  Clindamycin             ITCHING  Iodine (Topical)        RASH    Medications:  • insulin detemir  10 Units Subcutaneous Nightly   • Levothyroxine Sodium  125 mcg Per G Tube Before breakfast   • cefTRIAXone  1 g Jenn Salamanca Chem:  Recent Labs   Lab 04/02/20  1237 04/06/20  1433 04/06/20 2007 04/07/20  0418   * 132* 132* 136   K 4.50 3.7 4.3 3.3*   * 98 101 103   CO2 18.2* 24.0 16.0* 23.0   BUN 24.0* 25* 27* 26*   CREATSERUM 1.37* 1.22* 1.56* 1.25*   CA  --

## 2020-04-07 NOTE — PLAN OF CARE
Patient intubated, FI02 40%, 02 Sats %  NSR per monitor. Patient on Fentanyl, Propofol and Heparin gtt per PE protocol. COVID-19 result negative; pt to be transferred to CCU when bed available. OGT to LIS. Alan draining CY urine.     Will continu

## 2020-04-07 NOTE — RESPIRATORY THERAPY NOTE
Received patient on full vent support VC+ 12/550/50%/+5. Vent changes made by Dr. Mago Villalpando. Plateau's 20 during the night. Per pulm notes, will wean FiO2 as tolerated. Will continue to monitor.      @0330: FiO2 weaned to 40%

## 2020-04-07 NOTE — RESPIRATORY THERAPY NOTE
Changed pt vent settings to RR 10  FiO2 40% PEEP 5. Pt remain stable at current settings. Breath sounds are clear bilaterally, with very few secretions.

## 2020-04-07 NOTE — H&P
[facility] Hospitalist H&P       CC: Patient presents with:  Chest Pain Angina  Cough/URI       PCP: Brandi Irizarry MD    History of Present Illness: Patient is a 71year old female with PMH sig for COPD, HTN, HL, DM, hypothyroidism, GERD, Stage IIIA breas LASER WITH CYSTOSCOPY Left 8/21/2014    Performed by Shashank Riggins DO at [facility]   • MASTECTOMY LEFT     • OTHER SURGICAL HISTORY      spinal fusion   • OTHER SURGICAL HISTORY  8/21/14    Cysto LT Kaleen Ours, LT Stent- Dr Pako Elam Hx  Family History   Problem Relation Age of Onset   • Cancer Father         renal cell arcinoma   • Cancer Brother         melanoma, esophageal       Review of Systems  Comprehensive ROS reviewed and negative except for what's stated above.   Including neg Labs   Lab 04/06/20  1433 04/06/20  2007 04/07/20  0032 04/07/20  0418   TROP <0.045 0.046* 0.082* 0.097*       Additional Diagnostics: ECG: ST    CXR: image personally reviewed mid left lung/left lower lung consolidation    Radiology: Ct Angiography, Ches CONCLUSION:  1. Acute pulmonary embolus to the left lower lobe. 2. Ground-glass opacities within the right upper lobe suggestive of pneumonia.  3. Consolidations noted within the left upper and left lower lobe may represent a combination of pneumonia and at Xr Chest Ap Portable  (cpt=71045)    Result Date: 4/6/2020  PROCEDURE:  XR CHEST AP PORTABLE  (CPT=71045)  TECHNIQUE:  AP chest radiograph was obtained.   COMPARISON:  [facility], XR, XR CHEST AP PORTABLE  (CPT=71045), 4/06/2020, 6:01 PM.  INDICATIONS: spine.  CONCLUSION:   1. Endotracheal tube is 3 cm above the alma. 2. There is improved aeration at the left lung base in the area of left lower lobe airspace consolidation and pneumonia.    3. Stable patchy airspace disease at the right apex consistent PNA, PE and pleural effusion  -crit care managing - pt currently on ventilator  -CT with GGO PNA  -CRP high, LDH slightly up, ferritin normal - will trend daily  -COVID-19 pending  -Bcx pending  -UA mildly abnml; f/u Ucx  -empiric ceftriaxone/azithromycin [phone]

## 2020-04-08 NOTE — PROGRESS NOTES
Pulm / cc addendum:   Reassessed pt this afternoon after cardiac arrest. Reportedly pt was getting more agitated. Then went into bradycardia with HR in 40s followed by PEA; no palpable pulses. Code blue called overhead.  Pt achieved rosc with bagging and do

## 2020-04-08 NOTE — PROGRESS NOTES
Jaspal Olivares Patient Status:  Inpatient    1950 MRN [id number]   [facility] 6NE-A Attending Twyla Yuen, DO   Hosp Day # 2 PCP Darío Minor MD     Critical Care Progress Note     Date of Admission:  enema 133 mL, 1 enema, Rectal, Once PRN  •  Midazolam HCl (VERSED) 2 MG/2ML injection 2 mg, 2 mg, Intravenous, Q5 Min PRN  •  propofol (DIPRIVAN) infusion, 5-100 mcg/kg/min, Intravenous, Continuous  •  Dexmedetomidine HCl in NaCl (PRECEDEX) 400 MCG/100ML p (Temporal)   Resp 15   Wt 158 lb 11.7 oz (72 kg)   SpO2 100%   BMI 29.03 kg/m²      Vent Mode: VC/AC  FiO2 (%):  [40 %] 40 %  S RR:  [10] 10  S VT:  [500 mL] 500 mL  PEEP/CPAP (cm H2O):  [5 cm H20] 5 cm H20  MAP (cm H2O):  [7-8.4] 7      Wt Readings from L support for now    -pursue thoracentesis for both diagnostic and therapeutic purposes  -diuresis as tolerated  2. Pleural effusion - left  -ddx includes malignancy, anasarca, CHF, parapneumonic in assn with infection.   malignancy more likely given loculate

## 2020-04-08 NOTE — PROGRESS NOTES
INFECTIOUS DISEASE PROGRESS NOTE    Diogo Bradshaw Patient Status:  Inpatient    1950 MRN [id number]   [facility] 4SW-A Attending Pierce Denver, [address] [facility] Day # 2 PCP Lorna President EXCIS LUMBAR DISK,ONE LEVEL     • HOLMIUM  LITHOTRIPSY LASER WITH CYSTOSCOPY Left 8/21/2014    Performed by Felipa Llanes DO at [address] [facility]   • MASTECTOMY LEFT     • OTHER SURGICAL HISTORY      spinal fusion   • OTHER SURGICAL HISTORY  8 RASH  Clindamycin             ITCHING  Iodine (Topical)        RASH    Medications:    Current Facility-Administered Medications:   •  Insulin Aspart Pen (NOVOLOG) 100 UNIT/ML flexpen 2-10 Units, 2-10 Units, Subcutaneous, 4 times per day  •  sodium bicarbo (DEX-4) chewable tab 8 tablet, 8 tablet, Oral, Q15 Min PRN  •  fentaNYL citrate (SUBLIMAZE) 0.05 MG/ML injection 25 mcg, 25 mcg, Intravenous, Q30 Min PRN **OR** fentaNYL citrate (SUBLIMAZE) 0.05 MG/ML injection 50 mcg, 50 mcg, Intravenous, Q30 Min PRN  • with food. Drink more water to prevent constipation. , Disp: 30 tablet, Rfl: 0  Blood Glucose Monitoring Suppl (ACCU-CHEK AJAY) Does not apply Device, Use to test blood sugar three x daily, Disp: 1 Device, Rfl: 0  insulin glargine (LANTUS SOLOSTAR) 100 UNI [96.7 °F (35.9 °C)-97.8 °F (36.6 °C)] 97.3 °F (36.3 °C)  Pulse:  [] 96  Resp:  [10-23] 22  BP: ()/(36-92) 118/81  FiO2 (%):  [40 %-100 %] 100 %  HEENT: ET tube in place. Respiratory: Clear lungs bilaterally, anteriorly. No wheezes.   Cardiology < > 10.10* 9.86* 10.20*   LES  --    < > 79.4 137.6 126.3   LDH  --    < > 363* 308* 308*   DDIMER 4.25*  --   --  3.83*  --     < > = values in this interval not displayed. Microbiologic Data:   Hospital Encounter on 04/06/20   1.  BLOOD CULTURE 4/08/2020 at 2:53 PM   Finalized by: Anand Greene DO on 4/08/2020 at 3:00 PM       PROCEDURE: XR CHEST AP PORTABLE (CPT=71045)    TECHNIQUE: AP chest radiograph was obtained.     COMPARISON: [facility] , XR, XR CHEST AP PORTABLE (CPT=71045), 4/08/2020, 4:33 AM list:  Patient Active Problem List:     Depression     Hypothyroid     Uncontrolled type 2 diabetes mellitus without complication, without long-term current use of insulin     Nephrolithiasis     Hyperlipidemia associated with type 2 diabetes mellitus ([facility] [address] multi-factorial--> secondary to pulmonary embolus, CHF, pleural effusion and possible pna  - intubated    5. Cardiac arrest 4/8  - cardiology following    6. Metabolic acidosis, severe  - on bicarb drip  - renal following    7.  Met breast CA, port in place

## 2020-04-08 NOTE — CONSULTS
[facility]  Neurocritical Care Consult Note    Sharon Joya Patient Status:  Inpatient    1950 MRN [id number]   [facility] 6NE-A Attending Venecia Parish, [address] [facility] Day # 2 PCP MD Dyana Vargas was treated   • Unspecified essential hypertension    • Visual impairment     glasses       Past Surgical History:   Procedure Laterality Date   • BACK SURGERY     • BREAST MODIFIED RADICAL MASTECTOMY Left 1/28/2020    Performed by Tete De La Cruz MD at [facility] Taking  HYDROcodone-acetaminophen (NORCO) 5-325 MG Oral Tab, Take 1 tablet by mouth every 6 (six) hours as needed for Pain. Take with food. Drink more water to prevent constipation. , Disp: 30 tablet, Rfl: 0, Taking  Blood Glucose Monitoring Suppl Gm Reyes times daily as directed, Disp: 1 kit, Rfl: 0, Taking        Adderall, [Amphetam*    HIVES  Bactrim                 RASH  Eggs Or Egg-Derived*    DIARRHEA  Guaifenesin             SWELLING  Influenza Vaccines      ANAPHYLAXIS  Lactose Intolerance     SWELLI Aspart Pen (NOVOLOG) 100 UNIT/ML flexpen 2-10 Units, 2-10 Units, Subcutaneous, 4 times per day  Magnesium Sulfate IVPB premix SOLN 2 g, 2 g, Intravenous, Once  sodium bicarbonate 150 mEq in dextrose 5 % 1,000 mL infusion, 150 mEq, Intravenous, Continuous Intravenous, Q30 Min PRN    Or  fentaNYL citrate (SUBLIMAZE) 0.05 MG/ML injection 50 mcg, 50 mcg, Intravenous, Q30 Min PRN  acetaminophen (TYLENOL) tab 650 mg, 650 mg, Oral, Q6H PRN    Or  acetaminophen (TYLENOL) 160 MG/5ML oral liquid 650 mg, 650 mg, Oral nerves- pupils equally round and reactive to light, +corneal/cough  Motor/Sens- squeezes hands and wiggles toes bilat  ·     Diagnostics:         Lab Review     Recent Labs   Lab 04/07/20  0418 04/07/20  1413 04/07/20  2151 04/08/20  0419     --  133

## 2020-04-08 NOTE — PROGRESS NOTES
Goal: To achieve optimal ventilation and oxygenation.     INTERVENTIONS:  • Assess for changes in respiratory status  • Assess for changes in mentation and behavior  • Position to facilitate oxygenation and minimize respiratory effort  • Oxygen supplementat ETT is about 3.8 cm above the alma.

## 2020-04-08 NOTE — PLAN OF CARE
Patient was a code stroke from previous shift. Assumed patient care at [address] in [address]. Patient became unresponsive and hypotensive in ICU, code stroke called, pt to CT (which was negative for stroke or bleed).  CTA unable to be obtained d/t possible infiltratio

## 2020-04-08 NOTE — PROGRESS NOTES
[facility] Hospitalist Progress Note                                                                   Jaspal Piña  4/30/1950    SUBJECTIVE:  Pt seen and examined.   She has transient hypotens 04/07/20  1221 04/07/20  1742 04/07/20  2047 04/07/20  2358 04/08/20  0613   PGLU 209* 168* 160* 205* 171*       Meds:   Scheduled:   • Insulin Aspart Pen  2-10 Units Subcutaneous 4 times per day   • insulin detemir  10 Units Subcutaneous Nightly   • Levot respiratory failure - due to PNA, PE and pleural effusion  -crit care managing - pt currently on ventilator  -CT with GGO PNA  -CRP high, LDH slightly up, ferritin normal   -COVID-19 neg  -Bcx NGTD  -UA mildly abnml; f/u Ucx  -empiric ceftriaxone/azithromy approved)     Stage IIIA beast ca  -follows with onc as outpt - follows with Dr. Mahnaz Mcclure     GERD   -PPI     FEN - if not extubated soon, will need to start TFs  DVT prophy - hep gtt     Dispo - inpt care in the ICU.       Addendum:      Code blue called at 1

## 2020-04-08 NOTE — CM/SW NOTE
04/08/20 1100   CM/SW Referral Data   Referral Source Social Work (self-referral)   Reason for Referral Discharge planning;Psychoscial assessment   Informant Other  (sister, Woodrow Lorenzo)   Patient [address]   Number of Levels in

## 2020-04-08 NOTE — PROGRESS NOTES
[facility] Cardiology Progress Note        Lexii Screen Patient Status:  Inpatient    1950 MRN [id number]   [facility] [place] 6NE-A Attending Jake Fierro, [address] [facility] Day # 2 PCP MD Anna Figueredo COMMENTS)    Comment:Burning at the injection site  Vancomycin              SWELLING  Neurontin [Gabapent*    HALLUCINATION  Statins                 MYALGIA  Sulfa Antibiotics       RASH    Comment:Rash and itching all over body  [place] 133* 136   K 3.7 4.3 3.3* 4.2 4.6 4.6   CL 98 101 103  --  102 103   CO2 24.0 16.0* 23.0  --  21.0 23.0   BUN 25* 27* 26*  --  27* 30*   CREATSERUM 1.22* 1.56* 1.25*  --  1.63* 1.67*   CA 8.7 7.9* 7.9*  --  8.3* 8.0*   MG  --   --  1.6  --   --  1.8   PHOS

## 2020-04-08 NOTE — PROGRESS NOTES
04/08/20 9724   Clinical Encounter Type   Visited With Patient  (Nurse)   Patient's Supportive Strategies/Resources Judaism   Patient Spiritual Encounters   Spiritual Interventions  provided prayer with patient.

## 2020-04-08 NOTE — CONSULTS
[facility] - Nephrology  Report of Consultation    Samirnicolás Dorota Patient Status:  Inpatient    1950 MRN [id number]   [facility] 6NE-A Attending Danilo Hickey, [address] [facility] Day # 2 PCP Kalina Álvarez MD     Reason for co Left 1/28/2020    Performed by Diane Blakely MD at [address] [place] [address], [address] Left 8/9/2014    Performed by Gabriele Coronel DO at [address] [facility]   • CYSTOSCOPY, STENT EXCHANGE Left 8/21/2014    Performed by Gabriele Coronel DO at [facility] SOTELO COMMENTS)    Comment:Bowel obstruction  Flexeril [Cyclobenz*    OTHER (SEE COMMENTS)    Comment:Bowel obstruction  Gadolinium Derivati*    OTHER (SEE COMMENTS)    Comment:Reaction, unknown  Nsaids                  OTHER (SEE COMMENTS)    Comment:Bowel prob (DEX4) oral liquid 15 g, 15 g, Oral, Q15 Min PRN **OR** Glucose-Vitamin C (DEX-4) chewable tab 4 tablet, 4 tablet, Oral, Q15 Min PRN **OR** dextrose 50 % injection 50 mL, 50 mL, Intravenous, Q15 Min PRN **OR** glucose (DEX4) oral liquid 30 g, 30 g, Oral, Q each, Rfl: 3, Taking  Glucose Blood (ACCU-CHEK AJAY) In Vitro Strip, Use to test blood sugar four x daily, Disp: 400 strip, Rfl: 3, Taking  HYDROcodone-acetaminophen (NORCO) 5-325 MG Oral Tab, Take 1 tablet by mouth every 6 (six) hours as needed for Pain. capsule, Rfl: 3, Taking  Blood Glucose Monitoring Suppl (ACCU-CHEK AJAY PLUS) W/DEVICE Does not apply Kit, Use to test blood sugar 2-3 times daily as directed, Disp: 1 kit, Rfl: 0, Taking           PHYSICAL EXAM:     Vital Signs: /81 (BP Location: R 09/26/2019    BASABS 0.00 09/26/2019    NEPERCENT 88.7 04/08/2020    LYPERCENT 6.1 04/08/2020    MOPERCENT 4.3 04/08/2020    EOPERCENT 0.0 04/08/2020    BAPERCENT 0.1 04/08/2020    NE 12.79 (H) 04/08/2020    LYMABS 0.88 (L) 04/08/2020    MOABSO 0.62 04/08/ dialysis as above  Continue bicarbonate drip for now    Cardiomyopathy, pulmonary edema -needs volume removal as discussed above, cardiology following    Pulmonary embolism -Heparin drip    Prognosis is guarded at best.    Critical care time greater than 4

## 2020-04-08 NOTE — PROGRESS NOTES
Notified by RN of patient becoming hypotensive with SBP low 80s/high 70s. She remains intubated and sedated on propofol 30mcg/kg/min and fentanyl 25 mcg/hr unchanged throughout the day. On heparin gtt for probable apical thrombus.  At bedside to assess sheryl

## 2020-04-09 ENCOUNTER — APPOINTMENT (OUTPATIENT)
Dept: GENERAL RADIOLOGY | Facility: HOSPITAL | Age: 70
DRG: 208 | End: 2020-04-09
Attending: INTERNAL MEDICINE
Payer: MEDICARE

## 2020-04-09 NOTE — PLAN OF CARE
Assumed care of pt this am following RN report. Pt intubated, on propofol for sedation. Pt does not follow commands, does withdrawal to painful stimuli in all extremities. Propofol titrated off. Pt then able to open eyes and follow simple commands.  This af

## 2020-04-09 NOTE — PLAN OF CARE
Resumed patient care at 1900. Patient on vent sedated with propofol. On Levo gtt at 20 mcg. Also on Heparin and bicarb gtt. Patient was tachypnic and restless, increased propofol gtt and gave fentanyl IV push.  Patient's nephew called and said wants to come

## 2020-04-09 NOTE — PROGRESS NOTES
04/08/20 1529   Vent Information   $ RT Standby Charge (per 15 min) 1   Ventilator Initiation 04/06/20   Ventilation Day(s) 3   Interface Invasive   Vent Type Si   Vent ID   (rental)   Vent Mode PRVC/AC   Settings   FiO2 (%) 100 %   Resp Rate (Set) 22

## 2020-05-18 NOTE — DISCHARGE SUMMARY
General Medicine Discharge Summary     Patient ID:  Ezekiel Sebastian  79year old  4/30/1950    Admit date: 4/6/2020    Discharge date and time: 4/8/2020 11:17 PM     Attending Physician: Anne Palm lobar pneumonia, suspect bacterial  - cont azithro and rocephin empirically      L pleural effusion  - due to PNA vs CHF vs possible malignancy   - thoracentesis today per pulm     Hypotension  - weaned off pressors      Transient neurologic deficits   - r R-3    Levothyroxine Sodium 125 MCG Oral Tab  Take 1 tablet (125 mcg total) by mouth daily. , Normal, Disp-90 tablet, R-0    diphenhydrAMINE HCl 25 MG Oral Tab  Take 25 mg by mouth every 6 (six) hours as needed for Itching., Historical    Pantoprazole Sodiu a small amount to the port 30 minutes prior to access, Normal, Disp-30 g, R-3    Cyanocobalamin (VITAMIN B-12 OR)  Take 1 tablet by mouth daily.   , Historical    Loratadine-Pseudoephedrine ER (CLARITIN-D 24 HOUR)  MG Oral Tablet 24 Hr  Take 1 tablet

## 2020-08-21 NOTE — CONSULTS
[facility]  Report of Consultation    Kasia Hoover Patient Status:  Inpatient    1950 MRN [id number]   [facility] 4SW-A Attending Cesar Limon, [address] Day # 1 PCP Marilyn Paz MD     Reason for Consultation:  Known Received fax from [facility] regarding wound care. Placed on providers desk for review and ready to scan.    Thank you    Taylor WALDROP RN         pulmonary disease) ([facility]    • Depression    • DIABETES    • Diabetes ([facility]    • Diabetes mellitus ([place] [address].)    • Disorder of thyroid    • Dyspnea, unspecified type 4/6/2020   • Esophageal reflux    • GERD    • HYPERLIPIDEMIA    • HYPERTENSION    • HYPOTHYROIDI drugs. Allergies:     Adderall, [Amphetam*    HIVES  Bactrim                 RASH  Eggs Or Egg-Derived*    DIARRHEA  Guaifenesin             SWELLING  Influenza Vaccines      ANAPHYLAXIS  Lactose Intolerance     SWELLING  Levaquin [Levofloxa*    Tightnes MAGNESIA) 400 MG/5ML suspension 30 mL, 30 mL, Oral, Daily PRN  •  Fleet Enema (FLEET) 7-19 GM/118ML enema 133 mL, 1 enema, Rectal, Once PRN  •  Midazolam HCl (VERSED) 2 MG/2ML injection 2 mg, 2 mg, Intravenous, Q5 Min PRN  •  propofol (DIPRIVAN) infusion, [email or web address]    Review of Systems:  A 10-point review of systems was done with pertinent positives and negatives per the HPI. Physical Exam:   Blood pressure (!) 86/58, pulse 73, temperature 96.7 °F (35.9 °C), temperature source Temporal, resp.  rate 10 left lower lobe. Respiratory motion artifact somewhat limits evaluation. Reflux of contrast into the IVC and   hepatic veins suggestive of right heart dysfunction. LUNGS:  Ground-glass opacities within the right upper lobe.   Consolidations within the [place] left foot     Vitamin D deficiency     B12 deficiency     Malignant neoplasm of overlapping sites of left breast in female, estrogen receptor negative ([address]     Chemotherapy-induced neuropathy ([address]     Cellulitis of left breast     Pneumonia due to infect

## (undated) DEVICE — GAMMEX® NON-LATEX PI ORTHO SIZE 8.5, STERILE POLYISOPRENE POWDER-FREE SURGICAL GLOVE: Brand: GAMMEX

## (undated) DEVICE — Device

## (undated) DEVICE — ENCORE® PERRY STYLE 42 PF SIZE 6, STERILE LATEX POWDER-FREE SURGICAL GLOVE: Brand: ENCORE

## (undated) DEVICE — SYRINGE 30ML LL TIP

## (undated) DEVICE — SUTURE VICRYL 5-0 PC-1

## (undated) DEVICE — MEDI-VAC NON-CONDUCTIVE SUCTION TUBING: Brand: CARDINAL HEALTH

## (undated) DEVICE — HEMOCLIP MED 24 CLIP/CARTRIDGE

## (undated) DEVICE — DRAIN RELIAVAC 100CC

## (undated) DEVICE — DRAIN CHANNEL 19FR BLAKE

## (undated) DEVICE — KENDALL SCD EXPRESS SLEEVES, KNEE LENGTH, MEDIUM: Brand: KENDALL SCD

## (undated) DEVICE — CHLORAPREP 26ML APPLICATOR

## (undated) DEVICE — CV PACK-LF: Brand: MEDLINE INDUSTRIES, INC.

## (undated) DEVICE — SOLUTION ENDOSCOPIC ANTI-FOG NON-TOXIC NON-ABRASIVE 6 CUBIC CENTIMETER WITH RADIOPAQUE ADHESIVE-BACKED SPONGE STERILE NOT MADE WITH NATURAL RUBBER LATEX MEDICHOICE: Brand: MEDICHOICE

## (undated) DEVICE — SUTURE VICRYL 2-0 CT-1

## (undated) DEVICE — GLOVE SURG TRIUMPH SZ 8

## (undated) DEVICE — ARYGLE SUCTION CATHETER WITH CHIMNEY VALVE STRIAGHT PACKED 14 FR/ CH: Brand: ARGYLE

## (undated) DEVICE — BLADE ELECTRODE: Brand: EDGE

## (undated) DEVICE — VIOLET BRAIDED (POLYGLACTIN 910), SYNTHETIC ABSORBABLE SUTURE: Brand: COATED VICRYL

## (undated) DEVICE — STANDARD HYPODERMIC NEEDLE,POLYPROPYLENE HUB: Brand: MONOJECT

## (undated) DEVICE — SUTURE SILK 0 FSL

## (undated) DEVICE — BRA SURGICAL ELIZABETH PINK L

## (undated) DEVICE — BREAST-HERNIA-PORT CDS-LF: Brand: MEDLINE INDUSTRIES, INC.

## (undated) DEVICE — Device: Brand: PLUMEPEN

## (undated) DEVICE — LAPAROTOMY SPONGE - RF AND X-RAY DETECTABLE PRE-WASHED: Brand: SITUATE

## (undated) DEVICE — BANDAGE ELASTIC ACE 6\" X-LONG

## (undated) DEVICE — SUTURE VICRYL 3-0 SH

## (undated) DEVICE — ABSORBABLE HEMOSTAT (OXIDIZED REGENERATED CELLULOSE, U.S.P.): Brand: SURGICEL

## (undated) DEVICE — 3M™ STERI-STRIP™ REINFORCED ADHESIVE SKIN CLOSURES, R1547, 1/2 IN X 4 IN (12 MM X 100 MM), 6 STRIPS/ENVELOPE: Brand: 3M™ STERI-STRIP™

## (undated) DEVICE — STERILE POLYISOPRENE POWDER-FREE SURGICAL GLOVES: Brand: PROTEXIS

## (undated) DEVICE — SPONGE: SPECIALTY PEANUT XR 100/CS: Brand: MEDICAL ACTION INDUSTRIES

## (undated) DEVICE — GAUZE SPONGES,USP TYPE VII GAUZE, 12 PLY: Brand: CURITY

## (undated) DEVICE — SOL  .9 1000ML BTL

## (undated) DEVICE — SKIN AFFIX .4ML

## (undated) DEVICE — SYRINGE 10ML LL TIP

## (undated) DEVICE — DRAIN CHEST DRY ADULT/PED

## (undated) DEVICE — CURVED, SMALL JAW, OPEN SEALER/DIVIDER: Brand: LIGASURE

## (undated) DEVICE — CATH DRAIN 24F HYDRAGLIDE XL

## (undated) DEVICE — SUTURE SILK 2-0 SH

## (undated) DEVICE — SUTURE VICRYL 2-0

## (undated) DEVICE — DRAIN INCS 24FR 5/16INX8MM

## (undated) DEVICE — 3M™ IOBAN™ 2 ANTIMICROBIAL INCISE DRAPE 6650EZ: Brand: IOBAN™ 2

## (undated) DEVICE — SUTURE MONOCRYL 4-0 PS-2

## (undated) DEVICE — NON-ADHERENT PAD PREPACK: Brand: TELFA

## (undated) DEVICE — SUPER SPONGES,MEDIUM: Brand: KERLIX

## (undated) DEVICE — WOUND RETRACTOR AND PROTECTOR: Brand: ALEXIS WOUND PROTECTOR-RETRACTOR

## (undated) NOTE — LETTER
Latha Corbin M.D., F.A.C.S. Kings Saenz M.D., F.A.C.S. Rosy Hickey M.D., Veronica Romero. ROCIO Howe M.D., F.A.C.S. Steven Rose. Breanna Camacho M.D., F.A.C.S. CATALINO Davis M. Corrin Ax A.D. Travis Spare, M.D., F. chance to have all of your questions and concerns answered. If there are any issues which have not been adequately addressed, we ask you to bring them forward so that we can thoroughly address them.     A patient who is fully informed and understands their treatment, among other options and the risks and benefits of the different treatment options:    Yes _____ No _____    A [facility] surgeon as explained to me that if I should so desire, he/she is willing to explain my case and the surgical and non-surgical optio

## (undated) NOTE — LETTER
[facility] FOR BLOOD OR BLOOD COMPONENTS      In the course of your treatment, it may become necessary to administer a transfusion of blood or blood components.  This form provides basic information concerning this proc explain the alternatives to you if it has not already been done. Kiara Mitchell, have read/had read to me the above. I understand the matters bearing on the decision whether or not to authorize a transfusion of blood or blood components.  I have no qu

## (undated) NOTE — LETTER
Patient Name: Zoe Alcala        : 1950       Medical Record #: [id number]    CONSENT FOR PROCEDURES/SEDATION    Date: 2020       Time: 10:46 AM        1.  I authorize the performance upon Zoe Alcala the following:    ___Ultrasound

## (undated) NOTE — LETTER
Suzette Mckinley [address]  [place], [address] [place] [address]    Consent for Operation  Date: __________________                                Time: _______________    1.  I authorize the performance upon Mingo Woodruff the following operation:  Procedu procedure has been videotaped, the surgeon will obtain the original videotape. The hospital will not be responsible for storage or maintenance of this tape.   7. For the purpose of advancing medical education, I consent to the admittance of observers to the STATEMENTS REQUIRING INSERTION OR COMPLETION WERE FILLED IN.     Signature of Patient:   ___________________________    When the patient is a minor or mentally incompetent to give consent:  Signature of person authorized to consent for patient: ____________ supplements, and pills I can buy without a prescription (including street drugs/illegal medications). Failure to inform my anesthesiologist about these medicines may increase my risk of anesthetic complications. iv.  If I am allergic to anything or have ha Anesthesiologist Signature     Date   Time  I have discussed the procedure and information above with the patient (or patient’s representative) and answered their questions. The patient or their representative has agreed to have anesthesia services.     ___